# Patient Record
Sex: MALE | Race: WHITE | NOT HISPANIC OR LATINO | Employment: FULL TIME | URBAN - METROPOLITAN AREA
[De-identification: names, ages, dates, MRNs, and addresses within clinical notes are randomized per-mention and may not be internally consistent; named-entity substitution may affect disease eponyms.]

---

## 2021-10-28 ENCOUNTER — HOSPITAL ENCOUNTER (INPATIENT)
Facility: CLINIC | Age: 71
LOS: 5 days | Discharge: HOME | DRG: 177 | End: 2021-11-02
Attending: EMERGENCY MEDICINE | Admitting: INTERNAL MEDICINE
Payer: COMMERCIAL

## 2021-10-28 ENCOUNTER — APPOINTMENT (OUTPATIENT)
Dept: CT IMAGING | Facility: CLINIC | Age: 71
DRG: 177 | End: 2021-10-28
Attending: EMERGENCY MEDICINE
Payer: COMMERCIAL

## 2021-10-28 ENCOUNTER — APPOINTMENT (OUTPATIENT)
Dept: GENERAL RADIOLOGY | Facility: CLINIC | Age: 71
DRG: 177 | End: 2021-10-28
Attending: EMERGENCY MEDICINE
Payer: COMMERCIAL

## 2021-10-28 DIAGNOSIS — R09.02 HYPOXIA: ICD-10-CM

## 2021-10-28 DIAGNOSIS — D64.9 ANEMIA, UNSPECIFIED TYPE: ICD-10-CM

## 2021-10-28 DIAGNOSIS — U07.1 INFECTION DUE TO 2019 NOVEL CORONAVIRUS: ICD-10-CM

## 2021-10-28 LAB
ABO/RH(D): NORMAL
ALBUMIN SERPL-MCNC: 3.1 G/DL (ref 3.4–5)
ALP SERPL-CCNC: 73 U/L (ref 40–150)
ALT SERPL W P-5'-P-CCNC: 22 U/L (ref 0–70)
ANION GAP SERPL CALCULATED.3IONS-SCNC: 9 MMOL/L (ref 3–14)
ANTIBODY SCREEN: NEGATIVE
AST SERPL W P-5'-P-CCNC: 28 U/L (ref 0–45)
BASOPHILS # BLD AUTO: 0 10E3/UL (ref 0–0.2)
BASOPHILS NFR BLD AUTO: 0 %
BILIRUB SERPL-MCNC: 0.7 MG/DL (ref 0.2–1.3)
BLD PROD TYP BPU: NORMAL
BLOOD COMPONENT TYPE: NORMAL
BUN SERPL-MCNC: 28 MG/DL (ref 7–30)
CALCIUM SERPL-MCNC: 8.1 MG/DL (ref 8.5–10.1)
CHLORIDE BLD-SCNC: 95 MMOL/L (ref 94–109)
CO2 SERPL-SCNC: 22 MMOL/L (ref 20–32)
CODING SYSTEM: NORMAL
CREAT SERPL-MCNC: 1.29 MG/DL (ref 0.66–1.25)
CROSSMATCH: NORMAL
EOSINOPHIL # BLD AUTO: 0 10E3/UL (ref 0–0.7)
EOSINOPHIL NFR BLD AUTO: 0 %
ERYTHROCYTE [DISTWIDTH] IN BLOOD BY AUTOMATED COUNT: 15.9 % (ref 10–15)
GFR SERPL CREATININE-BSD FRML MDRD: 56 ML/MIN/1.73M2
GLUCOSE BLD-MCNC: 141 MG/DL (ref 70–99)
HCT VFR BLD AUTO: 18.5 % (ref 40–53)
HGB BLD-MCNC: 6.1 G/DL (ref 13.3–17.7)
HOLD SPECIMEN: NORMAL
IMM GRANULOCYTES # BLD: 0.1 10E3/UL
IMM GRANULOCYTES NFR BLD: 2 %
ISSUE DATE AND TIME: NORMAL
LACTATE SERPL-SCNC: 1.1 MMOL/L (ref 0.7–2)
LYMPHOCYTES # BLD AUTO: 0.1 10E3/UL (ref 0.8–5.3)
LYMPHOCYTES NFR BLD AUTO: 2 %
MCH RBC QN AUTO: 28.9 PG (ref 26.5–33)
MCHC RBC AUTO-ENTMCNC: 33 G/DL (ref 31.5–36.5)
MCV RBC AUTO: 88 FL (ref 78–100)
MONOCYTES # BLD AUTO: 0.3 10E3/UL (ref 0–1.3)
MONOCYTES NFR BLD AUTO: 6 %
NEUTROPHILS # BLD AUTO: 4.3 10E3/UL (ref 1.6–8.3)
NEUTROPHILS NFR BLD AUTO: 90 %
NRBC # BLD AUTO: 0 10E3/UL
NRBC BLD AUTO-RTO: 0 /100
PLATELET # BLD AUTO: 94 10E3/UL (ref 150–450)
POTASSIUM BLD-SCNC: 4.9 MMOL/L (ref 3.4–5.3)
PROT SERPL-MCNC: 6.4 G/DL (ref 6.8–8.8)
RBC # BLD AUTO: 2.11 10E6/UL (ref 4.4–5.9)
SODIUM SERPL-SCNC: 126 MMOL/L (ref 133–144)
SPECIMEN EXPIRATION DATE: NORMAL
TROPONIN I SERPL-MCNC: <0.015 UG/L (ref 0–0.04)
UNIT ABO/RH: NORMAL
UNIT NUMBER: NORMAL
UNIT STATUS: NORMAL
UNIT TYPE ISBT: 5100
WBC # BLD AUTO: 4.7 10E3/UL (ref 4–11)

## 2021-10-28 PROCEDURE — 99223 1ST HOSP IP/OBS HIGH 75: CPT | Mod: AI | Performed by: INTERNAL MEDICINE

## 2021-10-28 PROCEDURE — 999N000157 HC STATISTIC RCP TIME EA 10 MIN

## 2021-10-28 PROCEDURE — 85025 COMPLETE CBC W/AUTO DIFF WBC: CPT | Performed by: EMERGENCY MEDICINE

## 2021-10-28 PROCEDURE — 86140 C-REACTIVE PROTEIN: CPT | Performed by: INTERNAL MEDICINE

## 2021-10-28 PROCEDURE — 80053 COMPREHEN METABOLIC PANEL: CPT | Performed by: EMERGENCY MEDICINE

## 2021-10-28 PROCEDURE — 250N000011 HC RX IP 250 OP 636: Performed by: EMERGENCY MEDICINE

## 2021-10-28 PROCEDURE — 250N000013 HC RX MED GY IP 250 OP 250 PS 637: Performed by: EMERGENCY MEDICINE

## 2021-10-28 PROCEDURE — 86923 COMPATIBILITY TEST ELECTRIC: CPT | Performed by: EMERGENCY MEDICINE

## 2021-10-28 PROCEDURE — 96374 THER/PROPH/DIAG INJ IV PUSH: CPT

## 2021-10-28 PROCEDURE — P9016 RBC LEUKOCYTES REDUCED: HCPCS | Performed by: EMERGENCY MEDICINE

## 2021-10-28 PROCEDURE — 71045 X-RAY EXAM CHEST 1 VIEW: CPT

## 2021-10-28 PROCEDURE — 84484 ASSAY OF TROPONIN QUANT: CPT | Performed by: EMERGENCY MEDICINE

## 2021-10-28 PROCEDURE — 86901 BLOOD TYPING SEROLOGIC RH(D): CPT | Performed by: EMERGENCY MEDICINE

## 2021-10-28 PROCEDURE — 99285 EMERGENCY DEPT VISIT HI MDM: CPT | Mod: 25

## 2021-10-28 PROCEDURE — 200N000001 HC R&B ICU

## 2021-10-28 PROCEDURE — 94660 CPAP INITIATION&MGMT: CPT

## 2021-10-28 PROCEDURE — 83605 ASSAY OF LACTIC ACID: CPT | Performed by: EMERGENCY MEDICINE

## 2021-10-28 PROCEDURE — 5A09357 ASSISTANCE WITH RESPIRATORY VENTILATION, LESS THAN 24 CONSECUTIVE HOURS, CONTINUOUS POSITIVE AIRWAY PRESSURE: ICD-10-PCS | Performed by: INTERNAL MEDICINE

## 2021-10-28 PROCEDURE — 36415 COLL VENOUS BLD VENIPUNCTURE: CPT | Performed by: EMERGENCY MEDICINE

## 2021-10-28 PROCEDURE — 96375 TX/PRO/DX INJ NEW DRUG ADDON: CPT

## 2021-10-28 PROCEDURE — 85379 FIBRIN DEGRADATION QUANT: CPT | Performed by: INTERNAL MEDICINE

## 2021-10-28 PROCEDURE — 70450 CT HEAD/BRAIN W/O DYE: CPT

## 2021-10-28 PROCEDURE — 93005 ELECTROCARDIOGRAM TRACING: CPT

## 2021-10-28 RX ORDER — ACETAMINOPHEN 500 MG
1000 TABLET ORAL EVERY 4 HOURS PRN
Status: DISCONTINUED | OUTPATIENT
Start: 2021-10-28 | End: 2021-10-29

## 2021-10-28 RX ORDER — LORAZEPAM 2 MG/ML
1 INJECTION INTRAMUSCULAR ONCE
Status: COMPLETED | OUTPATIENT
Start: 2021-10-28 | End: 2021-10-28

## 2021-10-28 RX ORDER — DEXAMETHASONE SODIUM PHOSPHATE 10 MG/ML
6 INJECTION, SOLUTION INTRAMUSCULAR; INTRAVENOUS ONCE
Status: COMPLETED | OUTPATIENT
Start: 2021-10-28 | End: 2021-10-28

## 2021-10-28 RX ADMIN — DEXAMETHASONE SODIUM PHOSPHATE 6 MG: 10 INJECTION INTRAMUSCULAR; INTRAVENOUS at 19:34

## 2021-10-28 RX ADMIN — LORAZEPAM 1 MG: 2 INJECTION INTRAMUSCULAR; INTRAVENOUS at 20:20

## 2021-10-28 RX ADMIN — ACETAMINOPHEN 1000 MG: 500 TABLET, FILM COATED ORAL at 19:03

## 2021-10-28 ASSESSMENT — ENCOUNTER SYMPTOMS
CONFUSION: 1
SORE THROAT: 1
FATIGUE: 1
COUGH: 1
SPEECH DIFFICULTY: 0

## 2021-10-28 ASSESSMENT — ACTIVITIES OF DAILY LIVING (ADL)
ADLS_ACUITY_SCORE: 12

## 2021-10-28 NOTE — ED PROVIDER NOTES
History   Chief Complaint:  Covid Concern     The history is provided by the patient and a relative.      Mikal Nesbitt is a 71 year old male with history of obesity, type 2 diabetes, asthma who presents with Covid concern. The patient reports that he has been experiencing cough, sore throat, fatigue for about 7 days. He is answering questions normally, but has been confused today per his family. For example, he is unable to report the month and appears frustrated. Of note, the patient tested positive for Covid-19 about 7 days ago. His wife and son have also reportedly tested positive. The patient is fully vaccinated for Covid-19. There is no word-finding difficulty or slurred speech.    Review of Systems   Constitutional: Positive for fatigue.   HENT: Positive for sore throat.    Respiratory: Positive for cough.    Neurological: Negative for speech difficulty.   Psychiatric/Behavioral: Positive for confusion.   All other systems reviewed and are negative.        Allergies:  Amoxicillin  Lisinopril    Medications:  Bactrim  Glimepiride  Amlodipine  Allopurinol  Azelastine  Zovirax  Metformin  Hydrochlorothiazide  Atorvastatin    Past Medical History:     Type 2 diabetes  Diabetic retinopathy  Anemia  Thrombocytopenia  Follicular non-Hodgkin's lymphoma  Arterial stenosis  Hypertension  Hyperlipidemia   Asthma  Obesity  Neck lipoma  WALDEMAR  Testicular cyst  Hearing loss  Colonic polyps  Fatty liver      Past Surgical History:    Rhinoplasty  Lymphadenectomy  Vasectomy  Tonsillectomy and adenoidectomy  Sinus surgery    Family History:    Father: Cataract  Mother: Diabetes, cataract    Social History:  Presents to the emergency department alone  Arrives via car  Is  with a son    Physical Exam     Patient Vitals for the past 24 hrs:   BP Temp Temp src Pulse Resp SpO2   10/28/21 1723 134/44 (!) 101.6  F (38.7  C) Temporal 98 18 90 %       Physical Exam  Vitals: reviewed by me  General: Pt seen on [facility],  "pleasant, cooperative, and alert to conversation.  Sick appearing however.  Cannot tell me what month it is, states \"I do not know\"  Eyes: Tracking well, clear conjunctiva BL  ENT: MMM, midline trachea.   Lungs: Moderate respiratory distress, tachypneic with accessory muscle use noted.  Coughing frequently.  CV: Rate as above  MSK: no joint effusion.  No evidence of trauma  Skin: No rash  Neuro: Clear speech and no facial droop.  Moving all extremity spontaneously, following all commands.  Does not seem to have any word finding difficulty  Psych: Not RIS, no e/o AH/VH      Emergency Department Course   ECG  ECG obtained at 1733, ECG read at 1802  Normal sinus rhythm  Left anterior fascicular block   Rate 97 bpm. UT interval 150 ms. QRS duration 104 ms. QT/QTc 330/419 ms. P-R-T axes 39 -54 58.     Imaging:  CT Head w/o Contrast   Final Result   IMPRESSION:   1.  No CT evidence for acute intracranial process.   2.  Brain atrophy and presumed chronic microvascular ischemic changes as above. No intracranial mass, mass effect, or acute/evolving infarction.      3.  Air-fluid level right maxillary sinus may indicate acute right maxillary sinus inflammatory change.      4.  Mild motion artifact does degrade image quality.      XR Chest 1 View   Final Result   IMPRESSION: Severe bilateral airspace opacities consistent with pneumonia. Right IJ port catheter tip in the right atrium. No fluid collections or pneumothorax. Normal heart size.        Report per radiology    Laboratory:  Labs Ordered and Resulted from Time of ED Arrival to Time of ED Departure   COMPREHENSIVE METABOLIC PANEL - Abnormal       Result Value    Sodium 126 (*)     Potassium 4.9      Chloride 95      Carbon Dioxide (CO2) 22      Anion Gap 9      Urea Nitrogen 28      Creatinine 1.29 (*)     Calcium 8.1 (*)     Glucose 141 (*)     Alkaline Phosphatase 73      AST 28      ALT 22      Protein Total 6.4 (*)     Albumin 3.1 (*)     Bilirubin Total 0.7      " GFR Estimate 56 (*)    CBC WITH PLATELETS AND DIFFERENTIAL - Abnormal    WBC Count 4.7      RBC Count 2.11 (*)     Hemoglobin 6.1 (*)     Hematocrit 18.5 (*)     MCV 88      MCH 28.9      MCHC 33.0      RDW 15.9 (*)     Platelet Count 94 (*)     % Neutrophils 90      % Lymphocytes 2      % Monocytes 6      % Eosinophils 0      % Basophils 0      % Immature Granulocytes 2      NRBCs per 100 WBC 0      Absolute Neutrophils 4.3      Absolute Lymphocytes 0.1 (*)     Absolute Monocytes 0.3      Absolute Eosinophils 0.0      Absolute Basophils 0.0      Absolute Immature Granulocytes 0.1 (*)     Absolute NRBCs 0.0     TROPONIN I - Normal    Troponin I <0.015     LACTIC ACID WHOLE BLOOD - Normal    Lactic Acid 1.1     TYPE AND SCREEN, ADULT    ABO/RH(D) O POS      Antibody Screen Negative      SPECIMEN EXPIRATION DATE [id number]     PREPARE RED BLOOD CELLS (UNIT)    CROSSMATCH Compatible      UNIT ABO/RH O Pos      Unit Number [id number]      Unit Status Issued      Blood Component Type Red Blood Cells      Product Code G3248L40      CODING SYSTEM [id number]      UNIT TYPE ISBT 5100      ISSUE DATE AND TIME [id number]     PREPARE RED BLOOD CELLS (UNIT)   TRANSFUSE RED BLOOD CELLS (UNIT)   ABO/RH TYPE AND SCREEN        Emergency Department Course:  Reviewed:  I reviewed nursing notes, vitals and past medical history    Assessments:  1804 I obtained history and examined the patient as noted above.  1840 I rechecked the patient and explained findings.    Consults:  1929 I spoke with Dr. Rusty Dowd, hospitalist, who agreed to admit the patient.     Interventions:  1903 Tylenol 1000mg Oral  1934 Decadron 6mg IV  2020 Ativan 1mg IV    Disposition:  The patient was admitted to the hospital under the care of Dr. Dowd.     Impression & Plan     CMS Diagnoses: None    Medical Decision Making:  Mikal Nesbitt is a pleasant 71 year old male who is fully vaccinated, who presents to the emergency department with what appears to  be a worsening Covid infection. His symptoms are about 7 days old, but he got much more confused today, and his x-ray is highly concerning for worsening respiratory status. He actually was doing well here on nasal cannula initially, but I have decided to put him on BiPAP, as he has started to slowly decompensate and we want to stay ahead of it. After talking to the hospitalist, we are planning to admit him to the ICU given his immunocompromised state and high potential for not even being able to form antibodies, even with the vaccines. He also has anemia, which is not new for him, though it is under 7 and for this reasoning, he will be transfused. He was consented and his wife signed for him. His confusion may also be due to the hyponatremia, he is receiving judicious fluids for this given his pulmonary state. He also got decadron, and I do think he needs to be admitted to the ICU as above. Will plan for careful monitoring until next ICU bed is available. Dr. Dowd has kindly agreed to accept care of the patient.    Critical Care Time: was 30 minutes for this patient excluding procedures    Diagnosis:    ICD-10-CM    1. Infection due to 2019 novel coronavirus  U07.1    2. Hypoxia  R09.02    3. Anemia, unspecified type  D64.9        Scribe Disclosure:  I, Mikie Macdonald, am serving as a scribe at 6:03 PM on 10/28/2021 to document services personally performed by Jw Burton MD based on my observations and the provider's statements to me.              Jw Burton MD  10/28/21 1148

## 2021-10-28 NOTE — ED TRIAGE NOTES
Tested for Covid last Thursday. Came back positive. Patient altered today. Not eating. Normally alert and oriented. Difficulty following basic commands. High fevers at home. Family member with patient has covid as well, but is historian. Vaccinated patient.

## 2021-10-29 LAB
ALBUMIN SERPL-MCNC: 2.5 G/DL (ref 3.4–5)
ALP SERPL-CCNC: 63 U/L (ref 40–150)
ALT SERPL W P-5'-P-CCNC: 23 U/L (ref 0–70)
ANION GAP SERPL CALCULATED.3IONS-SCNC: 8 MMOL/L (ref 3–14)
AST SERPL W P-5'-P-CCNC: 22 U/L (ref 0–45)
ATRIAL RATE - MUSE: 97 BPM
BASE EXCESS BLDV CALC-SCNC: -6.2 MMOL/L (ref -7.7–1.9)
BILIRUB SERPL-MCNC: 0.4 MG/DL (ref 0.2–1.3)
BLD PROD TYP BPU: NORMAL
BLOOD COMPONENT TYPE: NORMAL
BUN SERPL-MCNC: 28 MG/DL (ref 7–30)
CALCIUM SERPL-MCNC: 7.8 MG/DL (ref 8.5–10.1)
CHLORIDE BLD-SCNC: 103 MMOL/L (ref 94–109)
CO2 SERPL-SCNC: 20 MMOL/L (ref 20–32)
CODING SYSTEM: NORMAL
CREAT SERPL-MCNC: 1.07 MG/DL (ref 0.66–1.25)
CROSSMATCH: NORMAL
CRP SERPL-MCNC: 112 MG/L (ref 0–8)
CRP SERPL-MCNC: 115 MG/L (ref 0–8)
D DIMER PPP FEU-MCNC: 1.71 UG/ML FEU (ref 0–0.5)
D DIMER PPP FEU-MCNC: 1.92 UG/ML FEU (ref 0–0.5)
DIASTOLIC BLOOD PRESSURE - MUSE: NORMAL MMHG
ERYTHROCYTE [DISTWIDTH] IN BLOOD BY AUTOMATED COUNT: 17.5 % (ref 10–15)
GFR SERPL CREATININE-BSD FRML MDRD: 70 ML/MIN/1.73M2
GLUCOSE BLD-MCNC: 263 MG/DL (ref 70–99)
GLUCOSE BLDC GLUCOMTR-MCNC: 148 MG/DL (ref 70–99)
GLUCOSE BLDC GLUCOMTR-MCNC: 167 MG/DL (ref 70–99)
GLUCOSE BLDC GLUCOMTR-MCNC: 192 MG/DL (ref 70–99)
GLUCOSE BLDC GLUCOMTR-MCNC: 215 MG/DL (ref 70–99)
GLUCOSE BLDC GLUCOMTR-MCNC: 217 MG/DL (ref 70–99)
GLUCOSE BLDC GLUCOMTR-MCNC: 261 MG/DL (ref 70–99)
HBA1C MFR BLD: 5.2 % (ref 0–5.6)
HCO3 BLDV-SCNC: 20 MMOL/L (ref 21–28)
HCT VFR BLD AUTO: 21.9 % (ref 40–53)
HGB BLD-MCNC: 6.6 G/DL (ref 13.3–17.7)
HGB BLD-MCNC: 7 G/DL (ref 13.3–17.7)
HGB BLD-MCNC: 7 G/DL (ref 13.3–17.7)
INTERPRETATION ECG - MUSE: NORMAL
ISSUE DATE AND TIME: NORMAL
MAGNESIUM SERPL-MCNC: 2 MG/DL (ref 1.6–2.3)
MCH RBC QN AUTO: 29 PG (ref 26.5–33)
MCHC RBC AUTO-ENTMCNC: 32 G/DL (ref 31.5–36.5)
MCV RBC AUTO: 91 FL (ref 78–100)
O2/TOTAL GAS SETTING VFR VENT: 25 %
P AXIS - MUSE: 39 DEGREES
PCO2 BLDV: 40 MM HG (ref 40–50)
PH BLDV: 7.3 [PH] (ref 7.32–7.43)
PLATELET # BLD AUTO: 66 10E3/UL (ref 150–450)
PO2 BLDV: 39 MM HG (ref 25–47)
POTASSIUM BLD-SCNC: 4.7 MMOL/L (ref 3.4–5.3)
PR INTERVAL - MUSE: 150 MS
PROT SERPL-MCNC: 5.7 G/DL (ref 6.8–8.8)
QRS DURATION - MUSE: 104 MS
QT - MUSE: 330 MS
QTC - MUSE: 419 MS
R AXIS - MUSE: -54 DEGREES
RBC # BLD AUTO: 2.41 10E6/UL (ref 4.4–5.9)
SODIUM SERPL-SCNC: 127 MMOL/L (ref 133–144)
SODIUM SERPL-SCNC: 131 MMOL/L (ref 133–144)
SYSTOLIC BLOOD PRESSURE - MUSE: NORMAL MMHG
T AXIS - MUSE: 58 DEGREES
TROPONIN I SERPL-MCNC: <0.015 UG/L (ref 0–0.04)
UNIT ABO/RH: NORMAL
UNIT NUMBER: NORMAL
UNIT STATUS: NORMAL
UNIT TYPE ISBT: 5100
VENTRICULAR RATE- MUSE: 97 BPM
WBC # BLD AUTO: 2.1 10E3/UL (ref 4–11)

## 2021-10-29 PROCEDURE — 82803 BLOOD GASES ANY COMBINATION: CPT | Performed by: INTERNAL MEDICINE

## 2021-10-29 PROCEDURE — XW043E5 INTRODUCTION OF REMDESIVIR ANTI-INFECTIVE INTO CENTRAL VEIN, PERCUTANEOUS APPROACH, NEW TECHNOLOGY GROUP 5: ICD-10-PCS | Performed by: HOSPITALIST

## 2021-10-29 PROCEDURE — 36415 COLL VENOUS BLD VENIPUNCTURE: CPT | Performed by: INTERNAL MEDICINE

## 2021-10-29 PROCEDURE — P9016 RBC LEUKOCYTES REDUCED: HCPCS | Performed by: INTERNAL MEDICINE

## 2021-10-29 PROCEDURE — 250N000009 HC RX 250: Performed by: INTERNAL MEDICINE

## 2021-10-29 PROCEDURE — 250N000012 HC RX MED GY IP 250 OP 636 PS 637: Performed by: HOSPITALIST

## 2021-10-29 PROCEDURE — 258N000003 HC RX IP 258 OP 636: Performed by: INTERNAL MEDICINE

## 2021-10-29 PROCEDURE — 250N000011 HC RX IP 250 OP 636: Performed by: INTERNAL MEDICINE

## 2021-10-29 PROCEDURE — 85018 HEMOGLOBIN: CPT | Performed by: INTERNAL MEDICINE

## 2021-10-29 PROCEDURE — 200N000001 HC R&B ICU

## 2021-10-29 PROCEDURE — 80053 COMPREHEN METABOLIC PANEL: CPT | Performed by: INTERNAL MEDICINE

## 2021-10-29 PROCEDURE — 85027 COMPLETE CBC AUTOMATED: CPT | Performed by: INTERNAL MEDICINE

## 2021-10-29 PROCEDURE — 83036 HEMOGLOBIN GLYCOSYLATED A1C: CPT | Performed by: HOSPITALIST

## 2021-10-29 PROCEDURE — 94660 CPAP INITIATION&MGMT: CPT

## 2021-10-29 PROCEDURE — 36591 DRAW BLOOD OFF VENOUS DEVICE: CPT | Performed by: INTERNAL MEDICINE

## 2021-10-29 PROCEDURE — 999N000157 HC STATISTIC RCP TIME EA 10 MIN

## 2021-10-29 PROCEDURE — 250N000012 HC RX MED GY IP 250 OP 636 PS 637: Performed by: INTERNAL MEDICINE

## 2021-10-29 PROCEDURE — 83735 ASSAY OF MAGNESIUM: CPT | Performed by: INTERNAL MEDICINE

## 2021-10-29 PROCEDURE — 84295 ASSAY OF SERUM SODIUM: CPT | Performed by: INTERNAL MEDICINE

## 2021-10-29 PROCEDURE — 250N000013 HC RX MED GY IP 250 OP 250 PS 637: Performed by: INTERNAL MEDICINE

## 2021-10-29 PROCEDURE — 84484 ASSAY OF TROPONIN QUANT: CPT | Performed by: INTERNAL MEDICINE

## 2021-10-29 PROCEDURE — 86923 COMPATIBILITY TEST ELECTRIC: CPT | Performed by: INTERNAL MEDICINE

## 2021-10-29 PROCEDURE — 85379 FIBRIN DEGRADATION QUANT: CPT | Performed by: INTERNAL MEDICINE

## 2021-10-29 PROCEDURE — 86140 C-REACTIVE PROTEIN: CPT | Performed by: INTERNAL MEDICINE

## 2021-10-29 PROCEDURE — 999N000185 HC STATISTIC TRANSPORT TIME EA 15 MIN

## 2021-10-29 PROCEDURE — 99291 CRITICAL CARE FIRST HOUR: CPT | Performed by: HOSPITALIST

## 2021-10-29 RX ORDER — ATORVASTATIN CALCIUM 20 MG/1
20 TABLET, FILM COATED ORAL EVERY EVENING
Status: DISCONTINUED | OUTPATIENT
Start: 2021-10-29 | End: 2021-11-02 | Stop reason: HOSPADM

## 2021-10-29 RX ORDER — SULFAMETHOXAZOLE AND TRIMETHOPRIM 400; 80 MG/1; MG/1
2 TABLET ORAL
Status: DISCONTINUED | OUTPATIENT
Start: 2021-10-29 | End: 2021-11-02 | Stop reason: HOSPADM

## 2021-10-29 RX ORDER — METFORMIN HCL 500 MG
1000 TABLET, EXTENDED RELEASE 24 HR ORAL 2 TIMES DAILY WITH MEALS
COMMUNITY

## 2021-10-29 RX ORDER — FLUTICASONE PROPIONATE 50 MCG
1 SPRAY, SUSPENSION (ML) NASAL DAILY
Status: DISCONTINUED | OUTPATIENT
Start: 2021-10-29 | End: 2021-11-02 | Stop reason: HOSPADM

## 2021-10-29 RX ORDER — AMLODIPINE BESYLATE 10 MG/1
10 TABLET ORAL DAILY
Status: DISCONTINUED | OUTPATIENT
Start: 2021-10-29 | End: 2021-11-02 | Stop reason: HOSPADM

## 2021-10-29 RX ORDER — CHLORAL HYDRATE 500 MG
1 CAPSULE ORAL DAILY
COMMUNITY

## 2021-10-29 RX ORDER — LOPERAMIDE HCL 2 MG
2 CAPSULE ORAL 4 TIMES DAILY PRN
Status: DISCONTINUED | OUTPATIENT
Start: 2021-10-29 | End: 2021-11-02 | Stop reason: HOSPADM

## 2021-10-29 RX ORDER — ACETAMINOPHEN 650 MG/1
650 SUPPOSITORY RECTAL EVERY 6 HOURS PRN
Status: DISCONTINUED | OUTPATIENT
Start: 2021-10-29 | End: 2021-11-02 | Stop reason: HOSPADM

## 2021-10-29 RX ORDER — SULFAMETHOXAZOLE/TRIMETHOPRIM 800-160 MG
1 TABLET ORAL
COMMUNITY

## 2021-10-29 RX ORDER — AZELASTINE 1 MG/ML
1 SPRAY, METERED NASAL 2 TIMES DAILY PRN
COMMUNITY

## 2021-10-29 RX ORDER — LORAZEPAM 2 MG/ML
0.5 INJECTION INTRAMUSCULAR EVERY 4 HOURS PRN
Status: DISCONTINUED | OUTPATIENT
Start: 2021-10-29 | End: 2021-11-02 | Stop reason: HOSPADM

## 2021-10-29 RX ORDER — BENZONATATE 100 MG/1
100 CAPSULE ORAL 3 TIMES DAILY PRN
Status: DISCONTINUED | OUTPATIENT
Start: 2021-10-29 | End: 2021-11-02 | Stop reason: HOSPADM

## 2021-10-29 RX ORDER — AMOXICILLIN 250 MG
2 CAPSULE ORAL 2 TIMES DAILY PRN
Status: DISCONTINUED | OUTPATIENT
Start: 2021-10-29 | End: 2021-11-02 | Stop reason: HOSPADM

## 2021-10-29 RX ORDER — NICOTINE POLACRILEX 4 MG
15-30 LOZENGE BUCCAL
Status: DISCONTINUED | OUTPATIENT
Start: 2021-10-29 | End: 2021-11-02 | Stop reason: HOSPADM

## 2021-10-29 RX ORDER — ONDANSETRON 2 MG/ML
4 INJECTION INTRAMUSCULAR; INTRAVENOUS EVERY 6 HOURS PRN
Status: DISCONTINUED | OUTPATIENT
Start: 2021-10-29 | End: 2021-11-02 | Stop reason: HOSPADM

## 2021-10-29 RX ORDER — UBIDECARENONE 200 MG
200 CAPSULE ORAL DAILY
COMMUNITY

## 2021-10-29 RX ORDER — PROCHLORPERAZINE MALEATE 5 MG
5 TABLET ORAL EVERY 6 HOURS PRN
Status: DISCONTINUED | OUTPATIENT
Start: 2021-10-29 | End: 2021-11-02 | Stop reason: HOSPADM

## 2021-10-29 RX ORDER — LIDOCAINE 40 MG/G
CREAM TOPICAL
Status: DISCONTINUED | OUTPATIENT
Start: 2021-10-29 | End: 2021-11-02 | Stop reason: HOSPADM

## 2021-10-29 RX ORDER — ACETAMINOPHEN 325 MG/1
650 TABLET ORAL EVERY 6 HOURS PRN
Status: DISCONTINUED | OUTPATIENT
Start: 2021-10-29 | End: 2021-11-02 | Stop reason: HOSPADM

## 2021-10-29 RX ORDER — POLYETHYLENE GLYCOL 3350 17 G/17G
17 POWDER, FOR SOLUTION ORAL DAILY PRN
Status: DISCONTINUED | OUTPATIENT
Start: 2021-10-29 | End: 2021-11-02 | Stop reason: HOSPADM

## 2021-10-29 RX ORDER — DEXAMETHASONE SODIUM PHOSPHATE 10 MG/ML
6 INJECTION, SOLUTION INTRAMUSCULAR; INTRAVENOUS DAILY
Status: DISCONTINUED | OUTPATIENT
Start: 2021-10-29 | End: 2021-11-01

## 2021-10-29 RX ORDER — ACYCLOVIR 200 MG/1
400 CAPSULE ORAL 2 TIMES DAILY
Status: DISCONTINUED | OUTPATIENT
Start: 2021-10-29 | End: 2021-10-31

## 2021-10-29 RX ORDER — DEXTROSE MONOHYDRATE 25 G/50ML
25-50 INJECTION, SOLUTION INTRAVENOUS
Status: DISCONTINUED | OUTPATIENT
Start: 2021-10-29 | End: 2021-10-31

## 2021-10-29 RX ORDER — GLIMEPIRIDE 4 MG/1
4 TABLET ORAL
COMMUNITY

## 2021-10-29 RX ORDER — ACYCLOVIR 400 MG/1
400 TABLET ORAL 2 TIMES DAILY
COMMUNITY

## 2021-10-29 RX ORDER — HYDROCHLOROTHIAZIDE 25 MG/1
12.5 TABLET ORAL DAILY
COMMUNITY

## 2021-10-29 RX ORDER — NICOTINE POLACRILEX 4 MG
15-30 LOZENGE BUCCAL
Status: DISCONTINUED | OUTPATIENT
Start: 2021-10-29 | End: 2021-10-31

## 2021-10-29 RX ORDER — AMOXICILLIN 250 MG
1 CAPSULE ORAL 2 TIMES DAILY PRN
Status: DISCONTINUED | OUTPATIENT
Start: 2021-10-29 | End: 2021-11-02 | Stop reason: HOSPADM

## 2021-10-29 RX ORDER — FLUTICASONE PROPIONATE 50 MCG
2 SPRAY, SUSPENSION (ML) NASAL 2 TIMES DAILY
COMMUNITY

## 2021-10-29 RX ORDER — PROCHLORPERAZINE 25 MG
12.5 SUPPOSITORY, RECTAL RECTAL EVERY 12 HOURS PRN
Status: DISCONTINUED | OUTPATIENT
Start: 2021-10-29 | End: 2021-11-02 | Stop reason: HOSPADM

## 2021-10-29 RX ORDER — IPRATROPIUM BROMIDE 21 UG/1
2 SPRAY, METERED NASAL EVERY 8 HOURS PRN
COMMUNITY

## 2021-10-29 RX ORDER — AMLODIPINE BESYLATE 10 MG/1
10 TABLET ORAL DAILY
COMMUNITY

## 2021-10-29 RX ORDER — ATORVASTATIN CALCIUM 20 MG/1
20 TABLET, FILM COATED ORAL DAILY
COMMUNITY

## 2021-10-29 RX ORDER — DEXTROSE MONOHYDRATE 25 G/50ML
25-50 INJECTION, SOLUTION INTRAVENOUS
Status: DISCONTINUED | OUTPATIENT
Start: 2021-10-29 | End: 2021-11-02 | Stop reason: HOSPADM

## 2021-10-29 RX ORDER — ONDANSETRON 4 MG/1
4 TABLET, ORALLY DISINTEGRATING ORAL EVERY 6 HOURS PRN
Status: DISCONTINUED | OUTPATIENT
Start: 2021-10-29 | End: 2021-11-02 | Stop reason: HOSPADM

## 2021-10-29 RX ORDER — SODIUM CHLORIDE 9 MG/ML
INJECTION, SOLUTION INTRAVENOUS CONTINUOUS
Status: DISCONTINUED | OUTPATIENT
Start: 2021-10-29 | End: 2021-10-30

## 2021-10-29 RX ORDER — BISACODYL 10 MG
10 SUPPOSITORY, RECTAL RECTAL DAILY PRN
Status: DISCONTINUED | OUTPATIENT
Start: 2021-10-29 | End: 2021-11-02 | Stop reason: HOSPADM

## 2021-10-29 RX ORDER — ALLOPURINOL 300 MG/1
300 TABLET ORAL DAILY
Status: DISCONTINUED | OUTPATIENT
Start: 2021-10-29 | End: 2021-10-29

## 2021-10-29 RX ADMIN — SODIUM CHLORIDE: 9 INJECTION, SOLUTION INTRAVENOUS at 18:39

## 2021-10-29 RX ADMIN — INSULIN ASPART 2 UNITS: 100 INJECTION, SOLUTION INTRAVENOUS; SUBCUTANEOUS at 20:03

## 2021-10-29 RX ADMIN — INSULIN ASPART 2 UNITS: 100 INJECTION, SOLUTION INTRAVENOUS; SUBCUTANEOUS at 08:23

## 2021-10-29 RX ADMIN — LORAZEPAM 0.5 MG: 2 INJECTION INTRAMUSCULAR; INTRAVENOUS at 22:47

## 2021-10-29 RX ADMIN — DEXAMETHASONE SODIUM PHOSPHATE 6 MG: 10 INJECTION INTRAMUSCULAR; INTRAVENOUS at 13:34

## 2021-10-29 RX ADMIN — TOCILIZUMAB 600 MG: 20 INJECTION, SOLUTION, CONCENTRATE INTRAVENOUS at 13:54

## 2021-10-29 RX ADMIN — SULFAMETHOXAZOLE AND TRIMETHOPRIM 2 TABLET: 400; 80 TABLET ORAL at 13:33

## 2021-10-29 RX ADMIN — SODIUM CHLORIDE: 9 INJECTION, SOLUTION INTRAVENOUS at 05:46

## 2021-10-29 RX ADMIN — INSULIN ASPART 3 UNITS: 100 INJECTION, SOLUTION INTRAVENOUS; SUBCUTANEOUS at 04:38

## 2021-10-29 RX ADMIN — INSULIN ASPART 1 UNITS: 100 INJECTION, SOLUTION INTRAVENOUS; SUBCUTANEOUS at 13:35

## 2021-10-29 RX ADMIN — ATORVASTATIN CALCIUM 20 MG: 20 TABLET, FILM COATED ORAL at 20:05

## 2021-10-29 RX ADMIN — AMLODIPINE BESYLATE 10 MG: 10 TABLET ORAL at 13:33

## 2021-10-29 RX ADMIN — REMDESIVIR 200 MG: 100 INJECTION, POWDER, LYOPHILIZED, FOR SOLUTION INTRAVENOUS at 01:43

## 2021-10-29 RX ADMIN — FAMOTIDINE 20 MG: 10 INJECTION, SOLUTION INTRAVENOUS at 03:31

## 2021-10-29 RX ADMIN — ACYCLOVIR 400 MG: 200 CAPSULE ORAL at 13:33

## 2021-10-29 RX ADMIN — SODIUM CHLORIDE 50 ML: 9 INJECTION, SOLUTION INTRAVENOUS at 02:13

## 2021-10-29 RX ADMIN — SODIUM CHLORIDE 50 ML: 9 INJECTION, SOLUTION INTRAVENOUS at 21:16

## 2021-10-29 RX ADMIN — FAMOTIDINE 20 MG: 10 INJECTION, SOLUTION INTRAVENOUS at 18:30

## 2021-10-29 RX ADMIN — ACYCLOVIR 400 MG: 200 CAPSULE ORAL at 20:05

## 2021-10-29 RX ADMIN — INSULIN ASPART 2 UNITS: 100 INJECTION, SOLUTION INTRAVENOUS; SUBCUTANEOUS at 01:40

## 2021-10-29 RX ADMIN — ALLOPURINOL 300 MG: 300 TABLET ORAL at 13:34

## 2021-10-29 RX ADMIN — INSULIN HUMAN 15 UNITS: 100 INJECTION, SUSPENSION SUBCUTANEOUS at 14:01

## 2021-10-29 RX ADMIN — REMDESIVIR 100 MG: 100 INJECTION, POWDER, LYOPHILIZED, FOR SOLUTION INTRAVENOUS at 21:15

## 2021-10-29 RX ADMIN — INSULIN ASPART 1 UNITS: 100 INJECTION, SOLUTION INTRAVENOUS; SUBCUTANEOUS at 18:31

## 2021-10-29 ASSESSMENT — ACTIVITIES OF DAILY LIVING (ADL)
ADLS_ACUITY_SCORE: 9
ADLS_ACUITY_SCORE: 8
ADLS_ACUITY_SCORE: 8
ADLS_ACUITY_SCORE: 12
ADLS_ACUITY_SCORE: 9
ADLS_ACUITY_SCORE: 8
ADLS_ACUITY_SCORE: 9
ADLS_ACUITY_SCORE: 8
ADLS_ACUITY_SCORE: 8
ADLS_ACUITY_SCORE: 10
ADLS_ACUITY_SCORE: 8
ADLS_ACUITY_SCORE: 12
ADLS_ACUITY_SCORE: 4
ADLS_ACUITY_SCORE: 8

## 2021-10-29 ASSESSMENT — MIFFLIN-ST. JEOR: SCORE: 1492

## 2021-10-29 NOTE — PROGRESS NOTES
[facility]    Hospitalist Progress Note  Name: Mikal Nesbitt    MRN: [id number]  Provider:  Armand Huitron DO MPH  Date of Service: 10/29/2021    Summary of Stay: Mikal Nesbitt is a 71 year old male with a history of Follicular non Hodgkin's lymphoma, WALDEMAR, DM2, Obesity, left HASEEB, HTN, HLP admitted on 10/28/2021 with shortness of breath and confusion.  Tested positive for COVID-19 about 7 days ago, appears symptoms started to some degree around 3 weeks ago.  Developed worsening shortness of breath and confusion over the subsequent several days prompting his presentation to the ED.    Blood pressure 134/44, heart rate 98, temperature 101.6  F, oxygen 90-94% initially on 2 L. Initial labs show sodium 126, potassium 4.9, chloride 95, bicarb 22, BUN 28, creatinine 1.29. Albumin 3.1 otherwise LFTs normal. WBC 4.7, hemoglobin 6.1, platelet count 94. Blood glucose is 141. Lactic acid normal at 1.1 and troponin is undetectable. EKG shows sinus tachycardia with LAFB. Noncontrast head CT n all old for sure egative for any acute pathology. Chest x-ray shows severe bilateral opacities along with his right IJ port.     He was placed on BiPAP and admitted to the ICU.  Overnight he received 2 units of packed red blood cells.  He received 6 mg IV dexamethasone and remdesivir.  No significant issues overnight.    Problem List:   1. Acute hypoxic respiratory failure secondary to COVID-19 pneumonia: Doing reasonably well on fairly low BiPAP settings.  Will attempt transition to HFNC today.  Taking dexamethasone for 10 days and remdesivir for 5 days.  Will consider tocilizumab if inflammatory markers increase or there is clinical deterioration.  Hold enoxaparin for now given pancytopenia requiring blood transfusion support.  2. Acute metabolic encephalopathy: Likely due to hypoxia and COVID-19 infection with possible contribution from hyponatremia.  Mental status does appear to be improving.  3. Acute kidney injury:  Creatinine is improving.  This is likely prerenal and related to limited oral intake over the past several days.  We will continue normal saline for now, can discontinue when he comes off BiPAP and oral intake is attempted.  Hold PTA hydrochlorothiazide.  4. Pancytopenia: Baseline hemoglobin is 7-9.  Was 6.1 on admission and required 2 units of packed red blood cells with hemoglobin now greater than 7.  No reports of bleeding.  Recheck CBC tomorrow, no further transfusions for now.  5. Follicular non-Hodgkin's lymphoma: Diagnosed in 1999 and completed CHOP chemotherapy.  Had recurrent positive retroperitoneal biopsy and bone marrow involvement this year requiring initiation of bendamustine/Rituxan.  Dee on acyclovir and Bactrim for prophylaxis, continue these for now.  Continue prophylactic allopurinol.  6. WALDEMAR: Currently on BiPAP.  7. Diabetes mellitus: Recent hemoglobin A1c 4.1 but now at risk for steroid-induced hyperglycemia.  Blood sugars currently in the 200s.  Hold prior to admission Metformin and glipizide and start NPH 15 units to be given with dexamethasone and continue medium sliding scale insulin.  8. Left carotid artery stenosis: No intervention.  9. Hyperlipidemia: Continue atorvastatin, monitor liver function test while on remdesivir.  10. Hypertension: Blood pressure well controlled.  Continue amlodipine but hold prior to admission hydrochlorothiazide.    DVT Prophylaxis: Pneumatic Compression Devices  Code Status: Full Code  Diet: NPO for Medical/Clinical Reasons Except for: Meds    Finch Catheter: Not present  Disposition: Expected discharge in 5+ days to TBD. Goals prior to discharge include manage respiratory failure.   Incidental Findings: None.  Family updated today: Called wife, confidential voicemail left.     Greater than 30 minutes of critical care time was spent in direct bedside management of the patient, managing BiPAP and respiratory failure, review of the EMR and order placement,  discussion with intensivist (Dr Alcazar) and bedside RN, and updating family.     Interval History   Assumed care from previous hospitalist. The history was fully reviewed.  No chest pain but some coughing and shortness of breath. No nausea, vomiting, diarrhea, constipation. No fevers. No other specific complaints identified.     -Data reviewed today: I personally reviewed all new labs and imaging results over the last 24 hours.     Physical Exam   Temp: 99.1  F (37.3  C) Temp src: Axillary BP: 119/58 Pulse: 70   Resp: 21 SpO2: 96 % O2 Device: BiPAP/CPAP    Vitals:    10/29/21 0100   Weight: 81.6 kg (179 lb 14.3 oz)     Vital Signs with Ranges  Temp:  [97.5  F (36.4  C)-101.6  F (38.7  C)] 99.1  F (37.3  C)  Pulse:  [] 70  Resp:  [12-76] 21  BP: (113-163)/() 119/58  FiO2 (%):  [2 %-40 %] 25 %  SpO2:  [87 %-100 %] 96 %  I/O last 3 completed shifts:  In: 967.5 [I.V.:267.5; IV Piggyback:50]  Out: 840 [Urine:840]    GENERAL: No apparent distress. Awake, alert, and fully oriented. On BiPAP.  HEENT: Normocephalic, atraumatic. Extraocular movements intact.  CARDIOVASCULAR: Regular rate and rhythm without murmurs or rubs. No S3.  PULMONARY: Clear bilaterally.  GASTROINTESTINAL: Soft, non-tender, non-distended. Bowel sounds normoactive.   EXTREMITIES: No cyanosis or clubbing. No edema.  NEUROLOGICAL: CN 2-12 grossly intact, no focal neurological deficits.  DERMATOLOGICAL: No rash, ulcer, bruising, nor jaundice.     Medications     - MEDICATION INSTRUCTIONS -       sodium chloride 75 mL/hr at 10/29/21 0546       remdesivir  100 mg Intravenous Q24H    And     sodium chloride 0.9%  50 mL Intravenous Q24H     acyclovir  400 mg Oral BID     allopurinol  300 mg Oral Daily     amLODIPine  10 mg Oral Daily     atorvastatin  20 mg Oral QPM     dexamethasone  6 mg Intravenous Daily     famotidine  20 mg Intravenous Q12H     fluticasone  1 spray Both Nostrils Daily     insulin aspart  1-6 Units Subcutaneous Q4H     insulin  NPH  15 Units Subcutaneous Daily     sodium chloride (PF)  3 mL Intracatheter Q8H     sulfamethoxazole-trimethoprim  2 tablet Oral Once per day on Mon Wed Fri     Data     Laboratory:  Recent Labs   Lab 10/29/21  0542 10/29/21  0132 10/28/21  1740   WBC 2.1*  --  4.7   HGB 7.0* 6.6* 6.1*   HCT 21.9*  --  18.5*   MCV 91  --  88   PLT 66*  --  94*     Recent Labs   Lab 10/29/21  0542 10/29/21  0437 10/29/21  0132 10/29/21  0114 10/28/21  1740   *  --  127*  --  126*   POTASSIUM 4.7  --   --   --  4.9   CHLORIDE 103  --   --   --  95   CO2 20  --   --   --  22   ANIONGAP 8  --   --   --  9   * 261*  --  217* 141*   BUN 28  --   --   --  28   CR 1.07  --   --   --  1.29*   GFRESTIMATED 70  --   --   --  56*   LILIAN 7.8*  --   --   --  8.1*     No results for input(s): CULT in the last 168 hours.    Imaging:  Recent Results (from the past 24 hour(s))   XR Chest 1 View    Narrative    EXAM: XR CHEST 1 VIEW  LOCATION: [facility]  DATE/TIME: 10/28/2021 6:50 PM    INDICATION: covid  COMPARISON: None.      Impression    IMPRESSION: Severe bilateral airspace opacities consistent with pneumonia. Right IJ port catheter tip in the right atrium. No fluid collections or pneumothorax. Normal heart size.   CT Head w/o Contrast    Narrative    EXAM: CT HEAD W/O CONTRAST  LOCATION: [facility]  DATE/TIME: 10/28/2021 6:40 PM    INDICATION: Mental status change, unknown cause.  COMPARISON: None.  TECHNIQUE: Routine CT Head without IV contrast. Multiplanar reformats. Dose reduction techniques were used.    FINDINGS:  INTRACRANIAL CONTENTS: No intracranial hemorrhage, extraaxial collection, or mass effect.  No CT evidence of acute infarct. Mild presumed chronic small vessel ischemic changes. Normal ventricles and sulci. Position of cerebellar tonsils is satisfactory.   Sella shows no acute abnormality. Mild motion artifact degrades image quality. Corpus callosum is  satisfactory.    VISUALIZED ORBITS/SINUSES/MASTOIDS: No acute orbital process. Mild to moderate mucosal thickening scattered about the paranasal sinuses. Air-fluid level right maxillary sinus suggests acute inflammatory sinusitis in this region. No middle ear or mastoid   effusion.    BONES/SOFT TISSUES: No fracture of the calvarium or skull base. Satisfactory mineralization. No significant swelling of the facial or scalp tissues.      Impression    IMPRESSION:  1.  No CT evidence for acute intracranial process.  2.  Brain atrophy and presumed chronic microvascular ischemic changes as above. No intracranial mass, mass effect, or acute/evolving infarction.    3.  Air-fluid level right maxillary sinus may indicate acute right maxillary sinus inflammatory change.    4.  Mild motion artifact does degrade image quality.         Armand Huitron DO MPH  [facility] Hospitalist  [address] [place] [address]  10/29/2021

## 2021-10-29 NOTE — PROGRESS NOTES
A BiPAP of  12/6 @ 40% was applied to the pt via the mask for an increase in WOB and/or SOB.  The bridge of the nose is good with gel pad in place.Pt is tolerating it well. Will continue to monitor and assess the pt's current respiratory status and needs.      Karo Eason, RT

## 2021-10-29 NOTE — PROGRESS NOTES
RT Note:    Patient taken off BiPAP around 1pm to 4 LPM NC. Tolerating well, SpO2 high 90s, RR mid 20s.

## 2021-10-29 NOTE — PHARMACY-ADMISSION MEDICATION HISTORY
"Admission medication history interview status for this patient is complete. See [facility] admission navigator for allergy information, prior to admission medications and immunization status.     Medication history interview done, indicate source(s): Patient and wife Annel via phone.  Medication history resources (including written lists, pill bottles, clinic record): Care everywhere.  Pharmacy: [id number].    Changes made to PTA medication list:  Added: all  Changed: none  Reported as Not Taking: none  Removed: none    Actions taken by pharmacist (provider contacted, etc):None     Additional medication history information: gets eye injections once a month. Glimepiride prescribed as 4mg tabs, take two tabs daily but takes one tab daily per his MD recommendation (low Hgb A1C). Hydrochlorothiazide prescribed as 25mg/day but takes 1/2 tab daily per MD recommendation.    Medication reconciliation/reorder completed by provider prior to medication history?  yes  (Y/N)     For patients on insulin therapy: no  How many times do you check your blood glucose per day? bid    Prior to Admission medications    Medication Sig Last Dose Taking? Auth Provider   acyclovir (ZOVIRAX) 400 MG tablet Take 400 mg by mouth 2 times daily 10/29/2021 at Unknown time Yes Unknown, Entered By History   amLODIPine (NORVASC) 10 MG tablet Take 10 mg by mouth daily 10/29/2021 at Unknown time Yes Unknown, Entered By History   atorvastatin (LIPITOR) 20 MG tablet Take 20 mg by mouth daily 10/29/2021 at Unknown time Yes Unknown, Entered By History   azelastine (ASTELIN) 0.1 % nasal spray Spray 1 spray into both nostrils 2 times daily as needed  uses \"once in a while\" Yes Unknown, Entered By History   coenzyme Q-10 200 MG CAPS Take 200 mg by mouth daily 10/28/2021 at Unknown time Yes Unknown, Entered By History   fish oil-omega-3 fatty acids 1000 MG capsule Take 1 g by mouth daily  10/28/2021 at Unknown time Yes Unknown, Entered By History   fluticasone " (FLONASE) 50 MCG/ACT nasal spray Spray 2 sprays into both nostrils 2 times daily  10/28/2021 at Unknown time Yes Unknown, Entered By History   glimepiride (AMARYL) 4 MG tablet Take 4 mg by mouth every morning (before breakfast) 10/28/2021 at Unknown time Yes Unknown, Entered By History   hydrochlorothiazide (HYDRODIURIL) 25 MG tablet Take 12.5 mg by mouth daily Take 1/2 of 25mg tab (12.5mg) daily. 10/28/2021 at Unknown time Yes Unknown, Entered By History   ipratropium (ATROVENT) 0.03 % nasal spray Spray 2 sprays into both nostrils every 8 hours as needed for rhinitis prn - does not use often Yes Unknown, Entered By History   metFORMIN (GLUCOPHAGE-XR) 500 MG 24 hr tablet Take 1,000 mg by mouth 2 times daily (with meals) 10/28/2021 at Unknown time Yes Unknown, Entered By History   Multiple Vitamins-Minerals (CENTRUM SILVER PO) Take 1 tablet by mouth daily 10/28/2021 at Unknown time Yes Unknown, Entered By History   psyllium (METAMUCIL/KONSYL) 58.6 % powder Take 1 Tablespoonful by mouth daily as needed for constipation prn Yes Unknown, Entered By History   sulfamethoxazole-trimethoprim (BACTRIM DS) 800-160 MG tablet Take 1 tablet by mouth three times a week 1tab by mouth three times weekly on Mon, Wed, Fri* 10/27/2021 Yes Unknown, Entered By History

## 2021-10-29 NOTE — ED NOTES
[facility]  ED Nurse Handoff Report    Mikal Nesbitt is a 71 year old male   ED Chief complaint: COVID+  . ED Diagnosis:   Final diagnoses:   Infection due to 2019 novel coronavirus   Hypoxia   Anemia, unspecified type     Allergies:   Allergies   Allergen Reactions     Amoxicillin Hives     Lisinopril      Might be the reason that causes his coughing      Penicillins      Unknown       Code Status: Full Code  Activity level - Baseline/Home:  Independent. Activity Level - Current:   Assist X 2. Lift room needed: No. Bariatric: No   Needed: No   Isolation: Yes. Infection: COVID+    Vital Signs:   Vitals:    10/28/21 1930 10/28/21 1945 10/28/21 1957 10/28/21 2013   BP:    114/52   Pulse: 107 100 93 99   Resp:   30 (!) 33   Temp:    97.7  F (36.5  C)   TempSrc:       SpO2:  90% 100%        Cardiac Rhythm:  ,      Pain level:    Patient confused: Yes. Patient Falls Risk: Yes.   Elimination Status: Has voided   Patient Report - Initial Complaint: COVID+. Focused Assessment: 71 year old male with history of obesity, type 2 diabetes, asthma who presents with Covid concern. The patient reports that he has been experiencing cough, sore throat, fatigue for about 7 days. He is answering questions normally, but has been confused today per his family. For example, he is unable to report the month and appears frustrated. Of note, the patient tested positive for Covid-19 about 7 days ago. His wife and son have also reportedly tested positive. The patient is fully vaccinated for Covid-19. There is no word-finding difficulty or slurred speech.     Review of Systems   Constitutional: Positive for fatigue.   HENT: Positive for sore throat.    Respiratory: Positive for cough.    Neurological: Negative for speech difficulty.   Psychiatric/Behavioral: Positive for confusion.   All other systems reviewed and are negative.     Pt on BiPap.   Tests Performed: Labs, xray. Abnormal Results:   Labs Ordered and Resulted  from Time of ED Arrival to Time of ED Departure   COMPREHENSIVE METABOLIC PANEL - Abnormal       Result Value    Sodium 126 (*)     Potassium 4.9      Chloride 95      Carbon Dioxide (CO2) 22      Anion Gap 9      Urea Nitrogen 28      Creatinine 1.29 (*)     Calcium 8.1 (*)     Glucose 141 (*)     Alkaline Phosphatase 73      AST 28      ALT 22      Protein Total 6.4 (*)     Albumin 3.1 (*)     Bilirubin Total 0.7      GFR Estimate 56 (*)    CBC WITH PLATELETS AND DIFFERENTIAL - Abnormal    WBC Count 4.7      RBC Count 2.11 (*)     Hemoglobin 6.1 (*)     Hematocrit 18.5 (*)     MCV 88      MCH 28.9      MCHC 33.0      RDW 15.9 (*)     Platelet Count 94 (*)     % Neutrophils 90      % Lymphocytes 2      % Monocytes 6      % Eosinophils 0      % Basophils 0      % Immature Granulocytes 2      NRBCs per 100 WBC 0      Absolute Neutrophils 4.3      Absolute Lymphocytes 0.1 (*)     Absolute Monocytes 0.3      Absolute Eosinophils 0.0      Absolute Basophils 0.0      Absolute Immature Granulocytes 0.1 (*)     Absolute NRBCs 0.0     TROPONIN I - Normal    Troponin I <0.015     LACTIC ACID WHOLE BLOOD - Normal    Lactic Acid 1.1     TYPE AND SCREEN, ADULT    ABO/RH(D) O POS      Antibody Screen Negative      SPECIMEN EXPIRATION DATE [id number]     PREPARE RED BLOOD CELLS (UNIT)    CROSSMATCH Compatible      UNIT ABO/RH O Pos      Unit Number [id number]      Unit Status Issued      Blood Component Type Red Blood Cells      Product Code [id number]      CODING SYSTEM [id number]      UNIT TYPE ISBT 5100      ISSUE DATE AND TIME [id number]     PREPARE RED BLOOD CELLS (UNIT)   TRANSFUSE RED BLOOD CELLS (UNIT)   ABO/RH TYPE AND SCREEN     CT Head w/o Contrast   Final Result   IMPRESSION:   1.  No CT evidence for acute intracranial process.   2.  Brain atrophy and presumed chronic microvascular ischemic changes as above. No intracranial mass, mass effect, or acute/evolving infarction.      3.  Air-fluid level right maxillary  sinus may indicate acute right maxillary sinus inflammatory change.      4.  Mild motion artifact does degrade image quality.      XR Chest 1 View   Final Result   IMPRESSION: Severe bilateral airspace opacities consistent with pneumonia. Right IJ port catheter tip in the right atrium. No fluid collections or pneumothorax. Normal heart size.        .   Treatments provided: See MAR, 1 unit PRBCs running  Family Comments: Family aware.  OBS brochure/video discussed/provided to patient:  N/A  ED Medications:   Medications   acetaminophen (TYLENOL) tablet 1,000 mg (1,000 mg Oral Given 10/28/21 1903)   LORazepam (ATIVAN) injection 1 mg (has no administration in time range)   dexamethasone PF (DECADRON) injection 6 mg (6 mg Intravenous Given 10/28/21 1934)     Drips infusing:  Yes - blood  For the majority of the shift, the patient's behavior Green. Interventions performed were N/A.    Sepsis treatment initiated: No     Patient tested for COVID 19 prior to admission: NO - positive already    ED Nurse Name/Phone Number: Michelle Kingsley RN,   8:15 PM  RECEIVING UNIT ED HANDOFF REVIEW    Above ED Nurse Handoff Report was reviewed: Yes  Reviewed by: Arsen Pitt RN on October 28, 2021 at 8:32 PM

## 2021-10-29 NOTE — PROGRESS NOTES
A BiPAP of 12/6  @ 25% was applied to the pt via the mask for an increase in WOB and/or SOB.  The bridge of the nose is intact with gel pad in place.Pt is tolerating it well. Will continue to monitor and assess the pt's current respiratory status and needs.      Lydia Sawyer, RT

## 2021-10-29 NOTE — PLAN OF CARE
ICU End of Shift Summary.  For vital signs and complete assessments, please see documentation flowsheets.     Pertinent assessments: Pt admitted this shift for shortness of breath/Bipap support. Confused to all but self when first admitted, which was consistent with report from ED. Started to clear as the night went on able to recall he was in the hospital and the date/time and president. LSs diminished throughout. Breathing doing well on Bipap with RR in the 20s and sats in the high 90s on 25% FiO2. No anxiety noted since admission to the ED. Tele SR.   Major Shift Events: Pt unable to void when first admitted at 0100 and felt like he had a lot of pressure present. Bladder scanned for 479 mL. Notified Tele-Hub with an order placed to straight cath. Attempted x 3 d/t resistance related to enlarged prostate. 800 mL of urine out at 0145. Up to commode later and was able to void.              - 0143 - Hemoglobin 6.6. Order placed for 1 unit of blood per Dr. Graahm. Given with no complications, recheck at 0800.                 - R port accessed with 1 inch needle.  Plan (Upcoming Events): Continue Bipap support and wean as able. Continue treatment for Covid with Decadron and Remdesivir. PRN as needed for anxiety.   Discharge/Transfer Needs: TBD. Continue ICU cares at this time.    Bedside Shift Report Completed   Bedside Safety Check Completed

## 2021-10-29 NOTE — H&P
[facility]  Hospitalist Admission Note  Name: Mikal Nesbitt    MRN: [id number]  YOB: 1950    Age: 71 year old  Date of admission: 10/28/2021  Primary care provider: Mariaa, [facility] [place]    Chief Complaint: Confusion, shortness of breath    Assessment and Plan:   Acute hypoxemic respiratory failure secondary COVID-19 pneumonia: Positive COVID-19 test on 10/21. He was vaccinated with the Pfizer series. Spouse and son also vaccinated and tested positive for COVID-19. Symptom onset difficult assay, but had loss of sense of smell and taste on 10/18. Has progressive confusion along with fever at home today. Has also been more short of breath, fatigue, sore throat, and frequent cough. Has chronic loose stools. Presenting here with hypoxia requiring initially 2 L oxygen and fever to 101.6  F. Chest x-ray shows severe bilateral opacities secondary to COVID-19 pneumonia. Developed worsening tachypnea while here and based on his chest x-ray suspect he will have significantly worsening hypoxia and respiratory failure over the next 24 hours.  -IV dexamethasone 6 mg daily  -Discussed risk and benefit with IV Remdesevir, spouse agrees to use  -Initiate BiPAP in the ER, monitor closely for intubation needs. I did discuss his critical illness with his spouse and that he very likely could worsen over the next 24 to 48 hours. She says he would want to be full code and not limit any interventions  -Check Covid labs including LFTs while on Remdesevir, monitor creatinine, CRP and D-dimer daily  -Acetaminophen as needed for fevers  -Guaifenesin and Tessalon Perles as needed for cough  -Albuterol nebs as needed for wheezing  -N.p.o. for now due to confusion and BiPAP  -Patient has thrombocytopenia and anemia currently requiring transfusion in the setting of non-Hodgkin's lymphoma. High risk for DVT/PE, but also wants some stability in his counts prior to initiating anticoagulation of any kind  therefore held off on Lovenox and have ordered PCD's to start with    Acute metabolic encephalopathy: Patient has become progressively confused the past couple of days. Suspect major contribution to his COVID-19 and perhaps hypoxia from this. Additionally he is hyponatremic at 124 and has not been taking in good fluids. Consistent with acute metabolic encephalopathy.  -Issues with BiPAP already, low-dose lorazepam 0.5 mg IV every 4 hours as needed, may need to add Haldol    JESS, hyponatremia: Creatinine 1.29 with baseline 1.0. Hyponatremia 126. Fluid and food intake has been poor the last few days in the setting of COVID-19 so likely hypovolemic. He does have trace edema on exam.  -Receiving blood transfusion now  -hypovolemic so will provide some IV fluids while npo with NS 75ml/hr  -BMP in the morning  -Hold his HCTZ    Acute on chronic anemia, chronic thrombocytopenia: Hemoglobin 6.1. Baseline has been in the 7-9 range and occasionally needed transfusion when he was receiving chemotherapy for Hodgkin's lymphoma this summer per spouse. Platelet count 94 which is actually up from mid 50 range. He has a few small bruises, but spouse has not noted any bleeding.  -Transfuse 1 unit RBCs now  -Repeat hemoglobin 2 hours after transfusion  -CBC tomorrow    Follicular non-Hodgkin's lymphoma: Initially diagnosed with follicular NHL in 1999 and completed CHOP chemotherapy. Recurrent positive retroperitoneal biopsy and bone marrow involvement March 2021 and started on bendamustine/Rituxan therapy. Finished fourth cycle in June 2021. He remains on acyclovir 40 mg twice daily and Bactrim -160 MWF prophylaxis. Actually due for follow-up CT this week.  -Trending blood counts as below  -Continue PTA Bactrim and acyclovir prophylaxis  -Resume allopurinol 300 mg daily    WALDEMAR: Unsure if using CPAP or not. Currently requiring BiPAP.    Obesity: BMI 32 per outside records.    Type II DM: Most recent A1c 4.1, but this is in the  setting of significant anemia. He is on Metformin XR and glipizide. His spouse reports recent reduction of these medications due to the A1c, but she is not sure what the doses are. Blood glucose is 141 in the ER.  -Medium dose line scale insulin every 4 hours while n.p.o.  -Anticipate hyperglycemia with dexamethasone and likely need to add either NPH or glargine depending on how high blood sugars go  -Hold Metformin and glipizide    Left carotid artery stenosis: Per spouse they have been monitoring this, but no intervention needed to this point.    HLD: Okay to resume atorvastatin 20 mg daily if taking p.o. as long as LFTs remain stable.    HTN: PTA on amlodipine 10 mg daily and HCTZ 25 mg daily. Blood pressure not significantly elevated in the ER.  -Resume amlodipine with hold parameters  -Hold HCTZ for hyponatremia      DVT Prophylaxis: Pneumatic Compression Devices, holding Lovenox initially given his thrombocytopenia and need for RBC transfusion  Code Status: Full Code - discussed with patient's spouse as he cannot contribute to this conversation now. She says he has never indicated that he would want any limited therapies.   FEN: N.p.o. due to confusion on BiPAP, NS at 75 ml/hr  Discharge Dispo: TBD  Estimated Disch Date / # of Days until Disch: Admit inpatient to the ICU due to BiPAP needs and progressive hypoxemic respiratory failure. Critically ill. Anticipate likely prolonged hospitalization      History of Present Illness:  Mikal Nesbitt is a 71 year old male with PMH including follicular non-Hodgkin's lymphoma initially treated in 1999 with CHOP therapy and then with recurrence he received bendamustine/Rituxan therapy through June of this year where he required intermittent transfusion for anemia and thrombocytopenia, type II DM, asthma, obesity, HTN, HLD, neuropathy, fatty liver, and WALDEMAR who tested positive for COVID-19 7 days ago who presents with his spouse for confusion and progressive shortness of  breath. He can provide very little history as he is not answering many questions due to his confusion. She states that for the past few days he has become increasingly confused. He has looked more short of breath and did report some difficulty breathing. He was febrile today so she brought in for evaluation. He has not felt very well for the past 3 weeks or so so it is difficult to say when his symptoms started. She does note that he lost sense of smell and taste about 10 days ago, 3 days prior to being tested. He is fully vaccinated for COVID-19 with the Pfizer series. She and her son also tested positive for COVID-19 and they are also vaccinated. He finished chemotherapy in June and is actually due for a follow-up CT scan this week. He has chronic loose stools, unchanged from baseline. He has not been eating or drinking very much this past week with feeling ill. She has not seen him have any vomiting and he has not been endorsing any abdominal pain or chest pain.     History obtained from patient (limited), patient's spouse, medical record, and from Dr. Burton in the emergency department. Blood pressure 134/44, heart rate 98-1 04, temperature 101.6  F, oxygen 90-94% initially on 2 L. Initial labs show sodium 126, potassium 4.9, chloride 95, bicarb 22, BUN 28, creatinine 1.29. Albumin 3.1 otherwise LFTs normal. WBC 4.7, hemoglobin 6.1, platelet count 94. Blood glucose is 141. Lactic acid normal at 1.1 and troponin is undetectable. EKG shows sinus tachycardia with LAFB. Noncontrast head CT n all old for sure egative for any acute pathology. Chest x-ray shows severe bilateral opacities along with his right IJ port. 1 unit RBCs has been ordered for transfusion. He received 6 mg IV dexamethasone and 1000 mg acetaminophen. He became progressively confused and tachypneic. Oxygen saturations remained low and he has been started on BiPAP for respiratory failure. He will be admitted to the ICU. I will provide signout to  the tele-ICU physician upon admission.     Past Medical History reviewed:  Past Medical History:   Diagnosis Date     Anemia      Fatty liver      Follicular non-Hodgkin's lymphoma (H)     most recent chemo summer 2021     HTN (hypertension)      Left carotid artery stenosis      Obesity      WALDEMAR (obstructive sleep apnea)      Thrombocytopenia (H)      Type 2 diabetes mellitus (H)      Past Surgical History reviewed:  Past Surgical History:   Procedure Laterality Date     ADENOIDECTOMY       SINUS SURGERY       TONSILLECTOMY       VASECTOMY       Social History reviewed:  Social History     Tobacco Use     Smoking status: Former Smoker     Types: Cigars     Quit date: 1970     Years since quittin.8   Substance Use Topics     Alcohol use: Not Currently     Social History     Social History Narrative     Not on file     Family History reviewed:  Family History   Problem Relation Age of Onset     Diabetes Mother      Parkinsonism Father         parkinson's disease     Allergies:  Allergies   Allergen Reactions     Amoxicillin Hives     Lisinopril      Might be the reason that causes his coughing      Penicillins      Unknown     Medications:  Amlodipine 10 mg daily  HCTZ 25 mg daily  Allopurinol 3 mg daily  Bactrim -160 every MWF  Acyclovir 400 mg twice daily  Atorvastatin 40 mg daily  Flonase  Metformin dose unknown  Amaryl dose unknown    Review of Systems:  Attempted, but unable to complete due to patient's encephalopathy     Physical Exam:  Blood pressure 123/52, pulse 95, temperature 97.7  F (36.5  C), resp. rate (!) 33, SpO2 99 %.  Wt Readings from Last 1 Encounters:   No data found for Wt     Exam:  Constitutional: Awake, appears in mild distress  Eyes: sclera white   HEENT: atraumatic, dry mucous membrane  Respiratory: He is in mild to moderate respiratory distress with tachypnea. He has bilateral diffuse crackles without wheeze. He is being placed on BiPAP now continues to pull at the  mask  Cardiovascular: R regular tachycardia, 1/6 systolic murmur  GI: Obese, non-tender, not distended, bowel sounds present  Skin: Few scattered ecchymoses on extremities  Musculoskeletal/extremities: atraumatic, no major deformities. Trace bilateral lower extremity edema  Neurologic: Alert, but not oriented to place or time, he keeps grabbing at the oxygen mask and is only partially redirectable, not following commands to do full strength assessment  Psychiatric: Anxious, not cooperative    Lab and imaging data personally reviewed:  Labs:  Recent Labs   Lab 10/28/21  1740   WBC 4.7   HGB 6.1*   HCT 18.5*   MCV 88   PLT 94*     Recent Labs   Lab 10/28/21  1740   *   POTASSIUM 4.9   CHLORIDE 95   CO2 22   ANIONGAP 9   *   BUN 28   CR 1.29*   GFRESTIMATED 56*   LILIAN 8.1*   PROTTOTAL 6.4*   ALBUMIN 3.1*   BILITOTAL 0.7   ALKPHOS 73   AST 28   ALT 22     Recent Labs   Lab 10/28/21  1740   LACT 1.1     Recent Labs   Lab 10/28/21  1740   TROPONIN <0.015       EKG: Sinus tachycardia, LAFB, no ST elevation or depression    Imaging:  Recent Results (from the past 24 hour(s))   XR Chest 1 View    Narrative    EXAM: XR CHEST 1 VIEW  LOCATION: [facility]  DATE/TIME: 10/28/2021 6:50 PM    INDICATION: covid  COMPARISON: None.      Impression    IMPRESSION: Severe bilateral airspace opacities consistent with pneumonia. Right IJ port catheter tip in the right atrium. No fluid collections or pneumothorax. Normal heart size.   CT Head w/o Contrast    Narrative    EXAM: CT HEAD W/O CONTRAST  LOCATION: [facility]  DATE/TIME: 10/28/2021 6:40 PM    INDICATION: Mental status change, unknown cause.  COMPARISON: None.  TECHNIQUE: Routine CT Head without IV contrast. Multiplanar reformats. Dose reduction techniques were used.    FINDINGS:  INTRACRANIAL CONTENTS: No intracranial hemorrhage, extraaxial collection, or mass effect.  No CT evidence of acute infarct. Mild presumed chronic  small vessel ischemic changes. Normal ventricles and sulci. Position of cerebellar tonsils is satisfactory.   Sella shows no acute abnormality. Mild motion artifact degrades image quality. Corpus callosum is satisfactory.    VISUALIZED ORBITS/SINUSES/MASTOIDS: No acute orbital process. Mild to moderate mucosal thickening scattered about the paranasal sinuses. Air-fluid level right maxillary sinus suggests acute inflammatory sinusitis in this region. No middle ear or mastoid   effusion.    BONES/SOFT TISSUES: No fracture of the calvarium or skull base. Satisfactory mineralization. No significant swelling of the facial or scalp tissues.      Impression    IMPRESSION:  1.  No CT evidence for acute intracranial process.  2.  Brain atrophy and presumed chronic microvascular ischemic changes as above. No intracranial mass, mass effect, or acute/evolving infarction.    3.  Air-fluid level right maxillary sinus may indicate acute right maxillary sinus inflammatory change.    4.  Mild motion artifact does degrade image quality.       Rusty Dowd MD  Hospitalist  [facility]

## 2021-10-29 NOTE — PROGRESS NOTES
Pt transported from ED to ICU without incident. FIO2 down to 25%, sats mid 90's. RT will monitor.    Karo Eason RT

## 2021-10-29 NOTE — PROVIDER NOTIFICATION
DATE:  10/29/2021   TIME OF RECEIPT FROM LAB:  143  LAB TEST:  hgb  LAB VALUE:  6.6  RESULTS GIVEN WITH READ-BACK TO (PROVIDER):  Tele ICU  TIME LAB VALUE REPORTED TO PROVIDER:   144

## 2021-10-30 LAB
ALBUMIN SERPL-MCNC: 2.6 G/DL (ref 3.4–5)
ALP SERPL-CCNC: 62 U/L (ref 40–150)
ALT SERPL W P-5'-P-CCNC: 22 U/L (ref 0–70)
ANION GAP SERPL CALCULATED.3IONS-SCNC: 5 MMOL/L (ref 3–14)
AST SERPL W P-5'-P-CCNC: 18 U/L (ref 0–45)
BILIRUB SERPL-MCNC: 0.4 MG/DL (ref 0.2–1.3)
BUN SERPL-MCNC: 26 MG/DL (ref 7–30)
CALCIUM SERPL-MCNC: 8.1 MG/DL (ref 8.5–10.1)
CHLORIDE BLD-SCNC: 109 MMOL/L (ref 94–109)
CO2 SERPL-SCNC: 21 MMOL/L (ref 20–32)
CREAT SERPL-MCNC: 0.88 MG/DL (ref 0.66–1.25)
CRP SERPL-MCNC: 56.5 MG/L (ref 0–8)
D DIMER PPP FEU-MCNC: 1.58 UG/ML FEU (ref 0–0.5)
ERYTHROCYTE [DISTWIDTH] IN BLOOD BY AUTOMATED COUNT: 17.8 % (ref 10–15)
GFR SERPL CREATININE-BSD FRML MDRD: 88 ML/MIN/1.73M2
GLUCOSE BLD-MCNC: 190 MG/DL (ref 70–99)
GLUCOSE BLDC GLUCOMTR-MCNC: 159 MG/DL (ref 70–99)
GLUCOSE BLDC GLUCOMTR-MCNC: 170 MG/DL (ref 70–99)
GLUCOSE BLDC GLUCOMTR-MCNC: 223 MG/DL (ref 70–99)
GLUCOSE BLDC GLUCOMTR-MCNC: 235 MG/DL (ref 70–99)
GLUCOSE BLDC GLUCOMTR-MCNC: 235 MG/DL (ref 70–99)
GLUCOSE BLDC GLUCOMTR-MCNC: 271 MG/DL (ref 70–99)
GLUCOSE BLDC GLUCOMTR-MCNC: 309 MG/DL (ref 70–99)
GLUCOSE BLDC GLUCOMTR-MCNC: 350 MG/DL (ref 70–99)
HCT VFR BLD AUTO: 25.9 % (ref 40–53)
HGB BLD-MCNC: 8.1 G/DL (ref 13.3–17.7)
MCH RBC QN AUTO: 28.1 PG (ref 26.5–33)
MCHC RBC AUTO-ENTMCNC: 31.3 G/DL (ref 31.5–36.5)
MCV RBC AUTO: 90 FL (ref 78–100)
PLATELET # BLD AUTO: 89 10E3/UL (ref 150–450)
POTASSIUM BLD-SCNC: 4.9 MMOL/L (ref 3.4–5.3)
PROT SERPL-MCNC: 5.5 G/DL (ref 6.8–8.8)
RBC # BLD AUTO: 2.88 10E6/UL (ref 4.4–5.9)
SODIUM SERPL-SCNC: 135 MMOL/L (ref 133–144)
WBC # BLD AUTO: 3.8 10E3/UL (ref 4–11)

## 2021-10-30 PROCEDURE — 99233 SBSQ HOSP IP/OBS HIGH 50: CPT | Performed by: HOSPITALIST

## 2021-10-30 PROCEDURE — 250N000009 HC RX 250: Performed by: INTERNAL MEDICINE

## 2021-10-30 PROCEDURE — 250N000013 HC RX MED GY IP 250 OP 250 PS 637: Performed by: INTERNAL MEDICINE

## 2021-10-30 PROCEDURE — 86140 C-REACTIVE PROTEIN: CPT | Performed by: INTERNAL MEDICINE

## 2021-10-30 PROCEDURE — 250N000011 HC RX IP 250 OP 636: Performed by: HOSPITALIST

## 2021-10-30 PROCEDURE — 85027 COMPLETE CBC AUTOMATED: CPT | Performed by: INTERNAL MEDICINE

## 2021-10-30 PROCEDURE — 250N000011 HC RX IP 250 OP 636: Performed by: INTERNAL MEDICINE

## 2021-10-30 PROCEDURE — 200N000001 HC R&B ICU

## 2021-10-30 PROCEDURE — 80053 COMPREHEN METABOLIC PANEL: CPT | Performed by: INTERNAL MEDICINE

## 2021-10-30 PROCEDURE — 258N000003 HC RX IP 258 OP 636: Performed by: INTERNAL MEDICINE

## 2021-10-30 PROCEDURE — 85379 FIBRIN DEGRADATION QUANT: CPT | Performed by: INTERNAL MEDICINE

## 2021-10-30 RX ORDER — ATORVASTATIN CALCIUM 20 MG/1
20 TABLET, FILM COATED ORAL DAILY
Status: DISCONTINUED | OUTPATIENT
Start: 2021-10-30 | End: 2021-10-30

## 2021-10-30 RX ADMIN — ATORVASTATIN CALCIUM 20 MG: 20 TABLET, FILM COATED ORAL at 20:19

## 2021-10-30 RX ADMIN — SODIUM CHLORIDE: 9 INJECTION, SOLUTION INTRAVENOUS at 07:17

## 2021-10-30 RX ADMIN — ACYCLOVIR 400 MG: 200 CAPSULE ORAL at 08:30

## 2021-10-30 RX ADMIN — INSULIN ASPART 5 UNITS: 100 INJECTION, SOLUTION INTRAVENOUS; SUBCUTANEOUS at 20:20

## 2021-10-30 RX ADMIN — SODIUM CHLORIDE 50 ML: 9 INJECTION, SOLUTION INTRAVENOUS at 18:03

## 2021-10-30 RX ADMIN — FLUTICASONE PROPIONATE 1 SPRAY: 50 SPRAY, METERED NASAL at 11:06

## 2021-10-30 RX ADMIN — ACYCLOVIR 400 MG: 200 CAPSULE ORAL at 20:19

## 2021-10-30 RX ADMIN — INSULIN ASPART 2 UNITS: 100 INJECTION, SOLUTION INTRAVENOUS; SUBCUTANEOUS at 03:38

## 2021-10-30 RX ADMIN — INSULIN ASPART 1 UNITS: 100 INJECTION, SOLUTION INTRAVENOUS; SUBCUTANEOUS at 08:27

## 2021-10-30 RX ADMIN — REMDESIVIR 100 MG: 100 INJECTION, POWDER, LYOPHILIZED, FOR SOLUTION INTRAVENOUS at 18:00

## 2021-10-30 RX ADMIN — AMLODIPINE BESYLATE 10 MG: 10 TABLET ORAL at 08:30

## 2021-10-30 RX ADMIN — FLUTICASONE PROPIONATE 1 SPRAY: 50 SPRAY, METERED NASAL at 21:07

## 2021-10-30 RX ADMIN — ENOXAPARIN SODIUM 40 MG: 40 INJECTION SUBCUTANEOUS at 11:06

## 2021-10-30 RX ADMIN — FAMOTIDINE 20 MG: 10 INJECTION, SOLUTION INTRAVENOUS at 02:08

## 2021-10-30 RX ADMIN — FAMOTIDINE 20 MG: 10 INJECTION, SOLUTION INTRAVENOUS at 14:18

## 2021-10-30 RX ADMIN — LORAZEPAM 0.5 MG: 2 INJECTION INTRAMUSCULAR; INTRAVENOUS at 20:19

## 2021-10-30 RX ADMIN — DEXAMETHASONE SODIUM PHOSPHATE 6 MG: 10 INJECTION INTRAMUSCULAR; INTRAVENOUS at 14:18

## 2021-10-30 RX ADMIN — INSULIN ASPART 1 UNITS: 100 INJECTION, SOLUTION INTRAVENOUS; SUBCUTANEOUS at 13:15

## 2021-10-30 RX ADMIN — INSULIN ASPART 3 UNITS: 100 INJECTION, SOLUTION INTRAVENOUS; SUBCUTANEOUS at 00:11

## 2021-10-30 RX ADMIN — INSULIN ASPART 2 UNITS: 100 INJECTION, SOLUTION INTRAVENOUS; SUBCUTANEOUS at 18:01

## 2021-10-30 ASSESSMENT — ACTIVITIES OF DAILY LIVING (ADL)
ADLS_ACUITY_SCORE: 11
ADLS_ACUITY_SCORE: 9
ADLS_ACUITY_SCORE: 9
ADLS_ACUITY_SCORE: 11
ADLS_ACUITY_SCORE: 9
ADLS_ACUITY_SCORE: 9
ADLS_ACUITY_SCORE: 11
ADLS_ACUITY_SCORE: 9
ADLS_ACUITY_SCORE: 10
ADLS_ACUITY_SCORE: 9
ADLS_ACUITY_SCORE: 11
ADLS_ACUITY_SCORE: 9
ADLS_ACUITY_SCORE: 10
ADLS_ACUITY_SCORE: 9
ADLS_ACUITY_SCORE: 11
ADLS_ACUITY_SCORE: 11
ADLS_ACUITY_SCORE: 9
ADLS_ACUITY_SCORE: 9
ADLS_ACUITY_SCORE: 11
ADLS_ACUITY_SCORE: 11
ADLS_ACUITY_SCORE: 10
ADLS_ACUITY_SCORE: 9
ADLS_ACUITY_SCORE: 11
ADLS_ACUITY_SCORE: 11

## 2021-10-30 ASSESSMENT — MIFFLIN-ST. JEOR: SCORE: 1462

## 2021-10-30 NOTE — PLAN OF CARE
ICU End of Shift Summary.  For vital signs and complete assessments, please see documentation flowsheets.     Pertinent assessments: patient remained on NC overnight. Decreased to 1.5L. maintaining sats > 90%. A&O. One dose of prn ativan given at bedtime. Lung sounds diminished and coarse. SR. Producing adequate urine and had a BM overnight.   Major Shift Events: decreased O2 needs to 1.5L NC.   Plan (Upcoming Events): TBD  Discharge/Transfer Needs: TBD     Bedside Shift Report Completed : y  Bedside Safety Check Completed: y

## 2021-10-30 NOTE — PROGRESS NOTES
[facility]    Hospitalist Progress Note  Name: Mikal Nesbitt    MRN: [id number]  Provider:  Armand Huitron DO MPH  Date of Service: 10/30/2021    Summary of Stay: Mikal Nesbitt is a 71 year old male with a history of Follicular non Hodgkin's lymphoma, WALDEMAR, DM2, Obesity, left HASEEB, HTN, HLP admitted on 10/28/2021 with shortness of breath and confusion.  Tested positive for COVID-19 about 7 days ago, appears symptoms started to some degree around 3 weeks ago.  Developed worsening shortness of breath and confusion over the subsequent several days prompting his presentation to the ED.    Blood pressure 134/44, heart rate 98, temperature 101.6  F, oxygen 90-94% initially on 2 L. Initial labs show sodium 126, potassium 4.9, chloride 95, bicarb 22, BUN 28, creatinine 1.29. Albumin 3.1 otherwise LFTs normal. WBC 4.7, hemoglobin 6.1, platelet count 94. Blood glucose is 141. Lactic acid normal at 1.1 and troponin is undetectable. EKG shows sinus tachycardia with LAFB. Noncontrast head CT n all old for sure egative for any acute pathology. Chest x-ray shows severe bilateral opacities along with his right IJ port.     He was placed on BiPAP and admitted to the ICU.  He received 2 units of packed red blood cells.  He received 6 mg IV dexamethasone and remdesivir and tocilizumab.  No significant issues overnight and now on NC.    Problem List:   1. Acute hypoxic respiratory failure secondary to COVID-19 pneumonia: Initially on BiPAP but quickly improved and now on 1-2L NC.  Taking dexamethasone for 10 days and remdesivir for 5 days.  Received tocilizumab 10/30.  Start enoxaparin given CBC is now stable and no bleeding reports.  2. Acute metabolic encephalopathy: Likely due to hypoxia and COVID-19 infection with possible contribution from hyponatremia.  Mental status does appear to be improving.  3. Acute kidney injury: Creatinine is improving.  This is likely prerenal and related to limited oral intake over the  past several days.  We will continue normal saline for now, can discontinue when he comes off BiPAP and oral intake is attempted.  Hold PTA hydrochlorothiazide.  4. Pancytopenia: Baseline hemoglobin is 7-9.  Was 6.1 on admission and required 2 units of packed red blood cells with hemoglobin now greater than 8.  No reports of bleeding.  Recheck CBC tomorrow, no further transfusions for now.  5. Follicular non-Hodgkin's lymphoma: Diagnosed in 1999 and completed CHOP chemotherapy.  Had recurrent positive retroperitoneal biopsy and bone marrow involvement this year requiring initiation of bendamustine/Rituxan.  Dee on acyclovir and Bactrim for prophylaxis, continue these for now.  Continue prophylactic allopurinol.  6. WALDEMAR: CPAP.  7. Diabetes mellitus: Recent hemoglobin A1c 4.1 but now at risk for steroid-induced hyperglycemia.  Blood sugars currently in the 100-200s.  Hold prior to admission Metformin and glipizide and increase NPH to 22 units to be given with dexamethasone and continue medium sliding scale insulin.  Add novolog 1 unit(s) per 15 grams carbohydrate.  8. Left carotid artery stenosis: No intervention.  9. Hyperlipidemia: Continue atorvastatin, monitor liver function test while on remdesivir.  10. Hypertension: Blood pressure slightly low.  Continue amlodipine (with hold parameters) but hold prior to admission hydrochlorothiazide.    DVT Prophylaxis: Start Lovenox 40 mg subcutaneous daily.  Code Status: Full Code  Diet: Regular Diet Adult    Finch Catheter: Not present  Disposition: Expected discharge in 2-3 days to TBD. Goals prior to discharge include manage respiratory failure. Transfer to F.  Incidental Findings: None.  Family updated today: Not today.        Interval History   The patient reports doing well. No chest pain but still coughing and shortness of breath. No nausea, vomiting, diarrhea, constipation. No fevers. No other specific complaints identified.     -Data reviewed today: I  personally reviewed all new labs and imaging results over the last 24 hours.     Physical Exam   Temp: (!) 96.6  F (35.9  C) Temp src: Oral BP: 134/63 Pulse: 67   Resp: 18 SpO2: 93 % O2 Device: Nasal cannula Oxygen Delivery: 1 LPM  Vitals:    10/29/21 0100 10/30/21 0540   Weight: 81.6 kg (179 lb 14.3 oz) 78.6 kg (173 lb 4.5 oz)     Vital Signs with Ranges  Temp:  [96.6  F (35.9  C)-99  F (37.2  C)] 96.6  F (35.9  C)  Pulse:  [56-86] 67  Resp:  [14-73] 18  BP: ()/(40-92) 134/63  FiO2 (%):  [25 %] 25 %  SpO2:  [62 %-100 %] 93 %  I/O last 3 completed shifts:  In: 2203 [P.O.:150; I.V.:2053]  Out: 4400 [Urine:4400]    GENERAL: No apparent distress. Awake, alert, and fully oriented. On BiPAP.  HEENT: Normocephalic, atraumatic. Extraocular movements intact.  CARDIOVASCULAR: Regular rate and rhythm without murmurs or rubs. No S3.  PULMONARY: Clear bilaterally.  GASTROINTESTINAL: Soft, non-tender, non-distended. Bowel sounds normoactive.   EXTREMITIES: No cyanosis or clubbing. No edema.  NEUROLOGICAL: CN 2-12 grossly intact, no focal neurological deficits.  DERMATOLOGICAL: No rash, ulcer, bruising, nor jaundice.     Medications     - MEDICATION INSTRUCTIONS -         remdesivir  100 mg Intravenous Q24H    And     sodium chloride 0.9%  50 mL Intravenous Q24H     acyclovir  400 mg Oral BID     amLODIPine  10 mg Oral Daily     atorvastatin  20 mg Oral QPM     dexamethasone  6 mg Intravenous Daily     famotidine  20 mg Intravenous Q12H     fluticasone  1 spray Both Nostrils Daily     insulin aspart   Subcutaneous TID AC     insulin aspart  1-6 Units Subcutaneous Q4H     insulin NPH  22 Units Subcutaneous Daily     sodium chloride (PF)  3 mL Intracatheter Q8H     sulfamethoxazole-trimethoprim  2 tablet Oral Once per day on Mon Wed Fri     Data     Laboratory:  Recent Labs   Lab 10/30/21  0535 10/29/21  0816 10/29/21  0542 10/29/21  0132 10/28/21  1740   WBC 3.8*  --  2.1*  --  4.7   HGB 8.1* 7.0* 7.0*   < > 6.1*   HCT 25.9*   --  21.9*  --  18.5*   MCV 90  --  91  --  88   PLT 89*  --  66*  --  94*    < > = values in this interval not displayed.     Recent Labs   Lab 10/30/21  0742 10/30/21  0535 10/30/21  0337 10/29/21  0822 10/29/21  0542 10/29/21  0437 10/29/21  0132 10/29/21  0114 10/28/21  1740   NA  --  135  --   --  131*  --  127*  --  126*   POTASSIUM  --  4.9  --   --  4.7  --   --   --  4.9   CHLORIDE  --  109  --   --  103  --   --   --  95   CO2  --  21  --   --  20  --   --   --  22   ANIONGAP  --  5  --   --  8  --   --   --  9   * 190* 223*   < > 263*   < >  --    < > 141*   BUN  --  26  --   --  28  --   --   --  28   CR  --  0.88  --   --  1.07  --   --   --  1.29*   GFRESTIMATED  --  88  --   --  70  --   --   --  56*   LILIAN  --  8.1*  --   --  7.8*  --   --   --  8.1*    < > = values in this interval not displayed.     No results for input(s): CULT in the last 168 hours.    Imaging:  No results found for this or any previous visit (from the past 24 hour(s)).      Armand Huitron DO MPH  [facility] Hospitalist  [address] [place] [address]  10/30/2021

## 2021-10-30 NOTE — PROGRESS NOTES
BiPAP on stby in room. No issues today. Remains on 1-3 LPM NC. Awaiting transfer to [facility] - [facility].     Mallory Scott, LRT, BSRT-RRT

## 2021-10-30 NOTE — PROGRESS NOTES
"End of shift summary- 0700-1900  Dx: Hypoxia/Covid   A/O: x4  Diet: Regular - fair appetite. PO fluids  Transfer: A1 - generalized weakness   Bathroom: Standing with urinal or bedside commode. Medium BM this evening  Pain: Denies   Respiratory: 1-3L via NC today. Was on BiPAP yesterday - in room on standby. Productive cough -  intermittent   Treatment: Remdesivir, Decadron, 02, q4 BS checks with sliding scale insulin and carb count. 100-200's.   Discharge Plans: tbd - from home with wife. Will be transferring out of ICU once bed opens up.     -updated franck (wife) on phone on status, [phone]    Blood pressure 130/58, pulse 74, temperature 98.6  F (37  C), resp. rate (!) 46, height 1.626 m (5' 4\"), weight 78.6 kg (173 lb 4.5 oz), SpO2 93 %.   "

## 2021-10-30 NOTE — PLAN OF CARE
ICU End of Shift Summary.  For vital signs and complete assessments, please see documentation flowsheets.     Pertinent assessments: patient remained on bipap, till approx 1300  To NC 4 Lpm.  Tolerated well, patient is alert oriented x 4, lopez, cms intact, c/o tremor in hands, patient stated this started 1-2 weeks ago. Occasional cough nonproductive.  Lungs diminished,  Vss, able to swallow po meds, cont to deny pain and sob    Major Shift Events: off bipap  Plan (Upcoming Events): cont poc  Discharge/Transfer Needs: tbd    Bedside Shift Report Completed : yes  Bedside Safety Check Completed: yes

## 2021-10-31 LAB
ALBUMIN SERPL-MCNC: 2.8 G/DL (ref 3.4–5)
ALP SERPL-CCNC: 62 U/L (ref 40–150)
ALT SERPL W P-5'-P-CCNC: 33 U/L (ref 0–70)
ANION GAP SERPL CALCULATED.3IONS-SCNC: 5 MMOL/L (ref 3–14)
AST SERPL W P-5'-P-CCNC: 27 U/L (ref 0–45)
BILIRUB SERPL-MCNC: 0.5 MG/DL (ref 0.2–1.3)
BUN SERPL-MCNC: 23 MG/DL (ref 7–30)
CALCIUM SERPL-MCNC: 8.2 MG/DL (ref 8.5–10.1)
CHLORIDE BLD-SCNC: 105 MMOL/L (ref 94–109)
CO2 SERPL-SCNC: 23 MMOL/L (ref 20–32)
CREAT SERPL-MCNC: 0.85 MG/DL (ref 0.66–1.25)
CRP SERPL-MCNC: 23.5 MG/L (ref 0–8)
D DIMER PPP FEU-MCNC: 1.28 UG/ML FEU (ref 0–0.5)
ERYTHROCYTE [DISTWIDTH] IN BLOOD BY AUTOMATED COUNT: 17.4 % (ref 10–15)
GFR SERPL CREATININE-BSD FRML MDRD: 89 ML/MIN/1.73M2
GLUCOSE BLD-MCNC: 200 MG/DL (ref 70–99)
GLUCOSE BLDC GLUCOMTR-MCNC: 175 MG/DL (ref 70–99)
GLUCOSE BLDC GLUCOMTR-MCNC: 187 MG/DL (ref 70–99)
GLUCOSE BLDC GLUCOMTR-MCNC: 191 MG/DL (ref 70–99)
GLUCOSE BLDC GLUCOMTR-MCNC: 303 MG/DL (ref 70–99)
GLUCOSE BLDC GLUCOMTR-MCNC: 88 MG/DL (ref 70–99)
HCT VFR BLD AUTO: 26.6 % (ref 40–53)
HGB BLD-MCNC: 8.3 G/DL (ref 13.3–17.7)
MCH RBC QN AUTO: 27.9 PG (ref 26.5–33)
MCHC RBC AUTO-ENTMCNC: 31.2 G/DL (ref 31.5–36.5)
MCV RBC AUTO: 90 FL (ref 78–100)
PLATELET # BLD AUTO: 91 10E3/UL (ref 150–450)
POTASSIUM BLD-SCNC: 4.6 MMOL/L (ref 3.4–5.3)
PROT SERPL-MCNC: 5.6 G/DL (ref 6.8–8.8)
RBC # BLD AUTO: 2.97 10E6/UL (ref 4.4–5.9)
SODIUM SERPL-SCNC: 133 MMOL/L (ref 133–144)
WBC # BLD AUTO: 3.9 10E3/UL (ref 4–11)

## 2021-10-31 PROCEDURE — 80053 COMPREHEN METABOLIC PANEL: CPT | Performed by: INTERNAL MEDICINE

## 2021-10-31 PROCEDURE — 258N000003 HC RX IP 258 OP 636: Performed by: INTERNAL MEDICINE

## 2021-10-31 PROCEDURE — 99233 SBSQ HOSP IP/OBS HIGH 50: CPT | Performed by: HOSPITALIST

## 2021-10-31 PROCEDURE — 250N000013 HC RX MED GY IP 250 OP 250 PS 637: Performed by: INTERNAL MEDICINE

## 2021-10-31 PROCEDURE — 250N000011 HC RX IP 250 OP 636: Performed by: HOSPITALIST

## 2021-10-31 PROCEDURE — 85027 COMPLETE CBC AUTOMATED: CPT | Performed by: INTERNAL MEDICINE

## 2021-10-31 PROCEDURE — 250N000011 HC RX IP 250 OP 636: Performed by: INTERNAL MEDICINE

## 2021-10-31 PROCEDURE — 120N000001 HC R&B MED SURG/OB

## 2021-10-31 PROCEDURE — 85379 FIBRIN DEGRADATION QUANT: CPT | Performed by: INTERNAL MEDICINE

## 2021-10-31 PROCEDURE — 86140 C-REACTIVE PROTEIN: CPT | Performed by: INTERNAL MEDICINE

## 2021-10-31 PROCEDURE — 250N000009 HC RX 250: Performed by: INTERNAL MEDICINE

## 2021-10-31 PROCEDURE — 250N000013 HC RX MED GY IP 250 OP 250 PS 637: Performed by: HOSPITALIST

## 2021-10-31 PROCEDURE — 250N000012 HC RX MED GY IP 250 OP 636 PS 637: Performed by: HOSPITALIST

## 2021-10-31 RX ORDER — ACYCLOVIR 400 MG/1
400 TABLET ORAL 2 TIMES DAILY
Status: DISCONTINUED | OUTPATIENT
Start: 2021-10-31 | End: 2021-11-02 | Stop reason: HOSPADM

## 2021-10-31 RX ORDER — GLIMEPIRIDE 4 MG/1
4 TABLET ORAL
Status: DISCONTINUED | OUTPATIENT
Start: 2021-10-31 | End: 2021-11-02 | Stop reason: HOSPADM

## 2021-10-31 RX ORDER — AMLODIPINE BESYLATE 10 MG/1
10 TABLET ORAL DAILY
Status: DISCONTINUED | OUTPATIENT
Start: 2021-10-31 | End: 2021-10-31

## 2021-10-31 RX ADMIN — ATORVASTATIN CALCIUM 20 MG: 20 TABLET, FILM COATED ORAL at 22:46

## 2021-10-31 RX ADMIN — REMDESIVIR 100 MG: 100 INJECTION, POWDER, LYOPHILIZED, FOR SOLUTION INTRAVENOUS at 17:01

## 2021-10-31 RX ADMIN — GLIMEPIRIDE 4 MG: 4 TABLET ORAL at 08:52

## 2021-10-31 RX ADMIN — ENOXAPARIN SODIUM 40 MG: 40 INJECTION SUBCUTANEOUS at 09:35

## 2021-10-31 RX ADMIN — FLUTICASONE PROPIONATE 1 SPRAY: 50 SPRAY, METERED NASAL at 08:47

## 2021-10-31 RX ADMIN — DEXAMETHASONE SODIUM PHOSPHATE 6 MG: 10 INJECTION INTRAMUSCULAR; INTRAVENOUS at 12:50

## 2021-10-31 RX ADMIN — INSULIN HUMAN 30 UNITS: 100 INJECTION, SUSPENSION SUBCUTANEOUS at 12:50

## 2021-10-31 RX ADMIN — INSULIN ASPART 1 UNITS: 100 INJECTION, SOLUTION INTRAVENOUS; SUBCUTANEOUS at 03:48

## 2021-10-31 RX ADMIN — FAMOTIDINE 20 MG: 10 INJECTION, SOLUTION INTRAVENOUS at 17:03

## 2021-10-31 RX ADMIN — INSULIN ASPART 1 UNITS: 100 INJECTION, SOLUTION INTRAVENOUS; SUBCUTANEOUS at 00:12

## 2021-10-31 RX ADMIN — ACYCLOVIR 400 MG: 200 CAPSULE ORAL at 08:47

## 2021-10-31 RX ADMIN — AMLODIPINE BESYLATE 10 MG: 10 TABLET ORAL at 08:47

## 2021-10-31 RX ADMIN — SODIUM CHLORIDE 50 ML: 9 INJECTION, SOLUTION INTRAVENOUS at 17:03

## 2021-10-31 RX ADMIN — ACYCLOVIR 400 MG: 400 TABLET ORAL at 22:46

## 2021-10-31 RX ADMIN — FAMOTIDINE 20 MG: 10 INJECTION, SOLUTION INTRAVENOUS at 03:42

## 2021-10-31 ASSESSMENT — ACTIVITIES OF DAILY LIVING (ADL)
ADLS_ACUITY_SCORE: 10
ADLS_ACUITY_SCORE: 8

## 2021-10-31 ASSESSMENT — MIFFLIN-ST. JEOR
SCORE: 1466
SCORE: 1450.74

## 2021-10-31 NOTE — PLAN OF CARE
ICU End of Shift Summary.  For vital signs and complete assessments, please see documentation flowsheets.     Pertinent assessments: A&Ox4. Afebrile. VSS. Tele shows SR. 1L NC. Lungs clear. Slight KAUR to commode. Regular diet. BS+. Voiding without any coliplications. Bruised skin. Stand-by assist x1. Port and PIC.     Major Shift Events: -Transferred to floor                                       -Switched BG to AC HS and carb coverage.     Plan (Upcoming Events): Continue to wean oxygen. PT for strengthening? Steroids.   Discharge/Transfer Needs: TBD    8358-Telephone report given to nurse Mery on 6th floor

## 2021-10-31 NOTE — PROGRESS NOTES
"Pt arrived 1440 from ICE. Report received from ICU RN. Pt settled in room. VSS 1L NC   /49   Pulse 68   Temp 97.1  F (36.2  C) (Oral)   Resp 16   Ht 1.626 m (5' 4\")   Wt 77.5 kg (170 lb 12.8 oz)   SpO2 98%   BMI 29.32 kg/m     Pt said no nausea, SOB, or pain at this time. A&Ox4. Right chest port and PIV R FA.  Report will be given to evening shift RN  "

## 2021-10-31 NOTE — PROGRESS NOTES
[facility]    Hospitalist Progress Note  Name: Mikal Nesbitt    MRN: [id number]  Provider:  Armand Huitron DO MPH  Date of Service: 10/31/2021    Summary of Stay: Mikal Nesbitt is a 71 year old male with a history of Follicular non Hodgkin's lymphoma, WALDEMAR, DM2, Obesity, left HASEEB, HTN, HLP admitted on 10/28/2021 with shortness of breath and confusion.  Tested positive for COVID-19 about 7 days ago, appears symptoms started to some degree around 3 weeks ago.  Developed worsening shortness of breath and confusion over the subsequent several days prompting his presentation to the ED.    Blood pressure 134/44, heart rate 98, temperature 101.6  F, oxygen 90-94% initially on 2 L. Initial labs show sodium 126, potassium 4.9, chloride 95, bicarb 22, BUN 28, creatinine 1.29. Albumin 3.1 otherwise LFTs normal. WBC 4.7, hemoglobin 6.1, platelet count 94. Blood glucose is 141. Lactic acid normal at 1.1 and troponin is undetectable. EKG shows sinus tachycardia with LAFB. Noncontrast head CT n all old for sure egative for any acute pathology. Chest x-ray shows severe bilateral opacities along with his right IJ port.     He was placed on BiPAP and admitted to the ICU.  He received 2 units of packed red blood cells.  He received 6 mg IV dexamethasone and remdesivir and tocilizumab.  No significant issues overnight and now on NC.    Problem List:   1. Acute hypoxic respiratory failure secondary to COVID-19 pneumonia: Initially on BiPAP but quickly improved and now on 1L NC.  Taking dexamethasone for 10 days and remdesivir for 5 days.  Received tocilizumab 10/30.  Started enoxaparin 10/30 given CBC is now stable and no bleeding reports.  2. Acute metabolic encephalopathy: Likely due to hypoxia and COVID-19 infection with possible contribution from hyponatremia.  Mental status does appear to be improving.  3. Acute kidney injury: Creatinine is improving.  This is likely prerenal and related to limited oral intake over  the past several days.  Stop normal saline 10/30.  Hold PTA hydrochlorothiazide.  4. Pancytopenia: Baseline hemoglobin is 7-9.  Was 6.1 on admission and required 2 units of packed red blood cells with hemoglobin now greater than 8.  No reports of bleeding.  Recheck CBC daily, no further transfusions for now.  5. Follicular non-Hodgkin's lymphoma: Diagnosed in 1999 and completed CHOP chemotherapy.  Had recurrent positive retroperitoneal biopsy and bone marrow involvement this year requiring initiation of bendamustine/Rituxan.  Dee on acyclovir and Bactrim for prophylaxis, continue these for now.  Continue prophylactic allopurinol.  6. WALDEMAR: CPAP.  7. Diabetes mellitus: Recent hemoglobin A1c 4.1 but now at risk for steroid-induced hyperglycemia.  Blood sugars currently in the 200-300s.  Hold prior to admission Metformin but resume glipizide and increase NPH to 30 units to be given with dexamethasone and increase to high sliding scale insulin.  Increase to novolog 1 unit(s) per 10 grams carbohydrate.  8. Left carotid artery stenosis: No intervention.  9. Hyperlipidemia: Continue atorvastatin, monitor liver function test while on remdesivir.  10. Hypertension: Blood pressure now stable bu initially somewhat low.  Continue amlodipine (with hold parameters) but hold prior to admission hydrochlorothiazide for now.    DVT Prophylaxis: Start Lovenox 40 mg subcutaneous daily.  Code Status: Full Code  Diet: Regular Diet Adult    Finch Catheter: Not present  Disposition: Expected discharge in 1-2 days to home. Goals prior to discharge include manage respiratory failure. Transfer to F.  Incidental Findings: None.  Family updated today: Not today.        Interval History   The patient reports doing well. No chest pain but still coughing and shortness of breath. No nausea, vomiting, diarrhea, constipation. No fevers. No other specific complaints identified.     -Data reviewed today: I personally reviewed all new labs and  imaging results over the last 24 hours.     Physical Exam   Temp: 97.5  F (36.4  C) Temp src: Oral BP: 138/72 Pulse: 64   Resp: 20 SpO2: 99 % O2 Device: Nasal cannula Oxygen Delivery: 1 LPM  Vitals:    10/29/21 0100 10/30/21 0540 10/31/21 0010   Weight: 81.6 kg (179 lb 14.3 oz) 78.6 kg (173 lb 4.5 oz) 79 kg (174 lb 2.6 oz)     Vital Signs with Ranges  Temp:  [97.4  F (36.3  C)-98.8  F (37.1  C)] 97.5  F (36.4  C)  Pulse:  [57-92] 64  Resp:  [13-77] 20  BP: (109-144)/() 138/72  SpO2:  [62 %-100 %] 99 %  I/O last 3 completed shifts:  In: 3924 [P.O.:2230; I.V.:1694]  Out: 3100 [Urine:3100]    GENERAL: No apparent distress. Awake, alert, and fully oriented. On BiPAP.  HEENT: Normocephalic, atraumatic. Extraocular movements intact.  CARDIOVASCULAR: Regular rate and rhythm without murmurs or rubs. No S3.  PULMONARY: Clear bilaterally.  GASTROINTESTINAL: Soft, non-tender, non-distended. Bowel sounds normoactive.   EXTREMITIES: No cyanosis or clubbing. No edema.  NEUROLOGICAL: CN 2-12 grossly intact, no focal neurological deficits.  DERMATOLOGICAL: No rash, ulcer, bruising, nor jaundice.     Medications     - MEDICATION INSTRUCTIONS -         remdesivir  100 mg Intravenous Q24H    And     sodium chloride 0.9%  50 mL Intravenous Q24H     acyclovir  400 mg Oral BID     amLODIPine  10 mg Oral Daily     atorvastatin  20 mg Oral QPM     dexamethasone  6 mg Intravenous Daily     enoxaparin ANTICOAGULANT  40 mg Subcutaneous Q24H     famotidine  20 mg Intravenous Q12H     fluticasone  1 spray Both Nostrils Daily     glimepiride  4 mg Oral QAM AC     insulin aspart   Subcutaneous TID AC     insulin aspart  1-6 Units Subcutaneous Q4H     insulin NPH  30 Units Subcutaneous Daily     sodium chloride (PF)  3 mL Intracatheter Q8H     sulfamethoxazole-trimethoprim  2 tablet Oral Once per day on Mon Wed Fri     Data     Laboratory:  Recent Labs   Lab 10/31/21  0348 10/30/21  0535 10/29/21  0816 10/29/21  0542 10/29/21  0542   WBC  3.9* 3.8*  --   --  2.1*   HGB 8.3* 8.1* 7.0*   < > 7.0*   HCT 26.6* 25.9*  --   --  21.9*   MCV 90 90  --   --  91   PLT 91* 89*  --   --  66*    < > = values in this interval not displayed.     Recent Labs   Lab 10/31/21  0348 10/31/21  0346 10/30/21  2220 10/30/21  0742 10/30/21  0535 10/29/21  0822 10/29/21  0542     --   --   --  135  --  131*   POTASSIUM 4.6  --   --   --  4.9  --  4.7   CHLORIDE 105  --   --   --  109  --  103   CO2 23  --   --   --  21  --  20   ANIONGAP 5  --   --   --  5  --  8   * 175* 309*   < > 190*   < > 263*   BUN 23  --   --   --  26  --  28   CR 0.85  --   --   --  0.88  --  1.07   GFRESTIMATED 89  --   --   --  88  --  70   LILIAN 8.2*  --   --   --  8.1*  --  7.8*    < > = values in this interval not displayed.     No results for input(s): CULT in the last 168 hours.    Imaging:  No results found for this or any previous visit (from the past 24 hour(s)).      Armand Huitron DO MPH  [facility] Hospitalist  [address] [place] [address]  10/31/2021

## 2021-10-31 NOTE — PLAN OF CARE
RN [id number]  ICU End of Shift Summary.  For vital signs and complete assessments, please see documentation flowsheets.     Pertinent assessments: A&Ox4. VSS. Afebrile. Tele; NSR. Denies pain. SOB with KAUR. Encouraging IS/ CDB. On 1 L O2. Sats WDL. LS: dim. +BS. Voiding WDL. No change to skin. One piv and port SL. Up with SBA and gait belt  Major Shift Events: paged hospitalist at 2050 pm due to high BG at 350 after appropriate sliding scale coverage given per protocol  Plan (Upcoming Events): Transfer to floor once bed available. Continue Remedesevir. Steroids.   Discharge/Transfer Needs: Home once appropriate    Bedside Shift Report Completed : y  Bedside Safety Check Completed: y

## 2021-10-31 NOTE — PLAN OF CARE
ICU End of Shift Summary.  For vital signs and complete assessments, please see documentation flowsheets.     Pertinent assessments: pt is alert and oriented, up with standby assist in the room. Pt voiding without difficulty per urinal. VSS, afebrile. Lungs sound clear but diminished. Pt on 1 liter of oxygen, sats 98-99%, pt preferred to keep oxygen on overnight.   Major Shift Events: none, slept between cares  Plan (Upcoming Events): continue current cares  Discharge/Transfer Needs: none    Bedside Shift Report Completed : y  Bedside Safety Check Completed:y

## 2021-11-01 LAB
ALBUMIN SERPL-MCNC: 2.8 G/DL (ref 3.4–5)
ALP SERPL-CCNC: 56 U/L (ref 40–150)
ALT SERPL W P-5'-P-CCNC: 32 U/L (ref 0–70)
ANION GAP SERPL CALCULATED.3IONS-SCNC: 5 MMOL/L (ref 3–14)
AST SERPL W P-5'-P-CCNC: 17 U/L (ref 0–45)
BILIRUB SERPL-MCNC: 0.7 MG/DL (ref 0.2–1.3)
BUN SERPL-MCNC: 18 MG/DL (ref 7–30)
CALCIUM SERPL-MCNC: 8.3 MG/DL (ref 8.5–10.1)
CHLORIDE BLD-SCNC: 105 MMOL/L (ref 94–109)
CO2 SERPL-SCNC: 26 MMOL/L (ref 20–32)
CREAT SERPL-MCNC: 0.84 MG/DL (ref 0.66–1.25)
CRP SERPL-MCNC: 11.7 MG/L (ref 0–8)
D DIMER PPP FEU-MCNC: 0.65 UG/ML FEU (ref 0–0.5)
ERYTHROCYTE [DISTWIDTH] IN BLOOD BY AUTOMATED COUNT: 17.2 % (ref 10–15)
GFR SERPL CREATININE-BSD FRML MDRD: 89 ML/MIN/1.73M2
GLUCOSE BLD-MCNC: 73 MG/DL (ref 70–99)
GLUCOSE BLDC GLUCOMTR-MCNC: 108 MG/DL (ref 70–99)
GLUCOSE BLDC GLUCOMTR-MCNC: 205 MG/DL (ref 70–99)
GLUCOSE BLDC GLUCOMTR-MCNC: 211 MG/DL (ref 70–99)
GLUCOSE BLDC GLUCOMTR-MCNC: 256 MG/DL (ref 70–99)
GLUCOSE BLDC GLUCOMTR-MCNC: 66 MG/DL (ref 70–99)
GLUCOSE BLDC GLUCOMTR-MCNC: 96 MG/DL (ref 70–99)
HCT VFR BLD AUTO: 26.8 % (ref 40–53)
HGB BLD-MCNC: 8.4 G/DL (ref 13.3–17.7)
MCH RBC QN AUTO: 27.6 PG (ref 26.5–33)
MCHC RBC AUTO-ENTMCNC: 31.3 G/DL (ref 31.5–36.5)
MCV RBC AUTO: 88 FL (ref 78–100)
PLATELET # BLD AUTO: 86 10E3/UL (ref 150–450)
POTASSIUM BLD-SCNC: 4.3 MMOL/L (ref 3.4–5.3)
PROT SERPL-MCNC: 5.3 G/DL (ref 6.8–8.8)
RBC # BLD AUTO: 3.04 10E6/UL (ref 4.4–5.9)
SODIUM SERPL-SCNC: 136 MMOL/L (ref 133–144)
WBC # BLD AUTO: 4 10E3/UL (ref 4–11)

## 2021-11-01 PROCEDURE — 250N000013 HC RX MED GY IP 250 OP 250 PS 637: Performed by: HOSPITALIST

## 2021-11-01 PROCEDURE — 99233 SBSQ HOSP IP/OBS HIGH 50: CPT | Performed by: INTERNAL MEDICINE

## 2021-11-01 PROCEDURE — 80053 COMPREHEN METABOLIC PANEL: CPT | Performed by: INTERNAL MEDICINE

## 2021-11-01 PROCEDURE — 250N000013 HC RX MED GY IP 250 OP 250 PS 637: Performed by: INTERNAL MEDICINE

## 2021-11-01 PROCEDURE — 258N000003 HC RX IP 258 OP 636: Performed by: HOSPITALIST

## 2021-11-01 PROCEDURE — 86140 C-REACTIVE PROTEIN: CPT | Performed by: INTERNAL MEDICINE

## 2021-11-01 PROCEDURE — 85379 FIBRIN DEGRADATION QUANT: CPT | Performed by: INTERNAL MEDICINE

## 2021-11-01 PROCEDURE — 250N000009 HC RX 250: Performed by: HOSPITALIST

## 2021-11-01 PROCEDURE — 85027 COMPLETE CBC AUTOMATED: CPT | Performed by: INTERNAL MEDICINE

## 2021-11-01 PROCEDURE — 250N000011 HC RX IP 250 OP 636: Performed by: HOSPITALIST

## 2021-11-01 PROCEDURE — 120N000001 HC R&B MED SURG/OB

## 2021-11-01 RX ADMIN — AMLODIPINE BESYLATE 10 MG: 10 TABLET ORAL at 08:40

## 2021-11-01 RX ADMIN — REMDESIVIR 100 MG: 100 INJECTION, POWDER, LYOPHILIZED, FOR SOLUTION INTRAVENOUS at 18:31

## 2021-11-01 RX ADMIN — ENOXAPARIN SODIUM 40 MG: 40 INJECTION SUBCUTANEOUS at 09:20

## 2021-11-01 RX ADMIN — DEXAMETHASONE SODIUM PHOSPHATE 6 MG: 10 INJECTION INTRAMUSCULAR; INTRAVENOUS at 12:33

## 2021-11-01 RX ADMIN — GLIMEPIRIDE 4 MG: 4 TABLET ORAL at 06:24

## 2021-11-01 RX ADMIN — FLUTICASONE PROPIONATE 1 SPRAY: 50 SPRAY, METERED NASAL at 08:41

## 2021-11-01 RX ADMIN — ATORVASTATIN CALCIUM 20 MG: 20 TABLET, FILM COATED ORAL at 20:13

## 2021-11-01 RX ADMIN — FAMOTIDINE 20 MG: 10 INJECTION, SOLUTION INTRAVENOUS at 08:40

## 2021-11-01 RX ADMIN — SULFAMETHOXAZOLE AND TRIMETHOPRIM 2 TABLET: 400; 80 TABLET ORAL at 09:20

## 2021-11-01 RX ADMIN — ACYCLOVIR 400 MG: 400 TABLET ORAL at 20:14

## 2021-11-01 RX ADMIN — SODIUM CHLORIDE 50 ML: 9 INJECTION, SOLUTION INTRAVENOUS at 18:34

## 2021-11-01 RX ADMIN — FAMOTIDINE 20 MG: 10 INJECTION, SOLUTION INTRAVENOUS at 20:13

## 2021-11-01 RX ADMIN — ACYCLOVIR 400 MG: 400 TABLET ORAL at 08:40

## 2021-11-01 ASSESSMENT — ACTIVITIES OF DAILY LIVING (ADL)
ADLS_ACUITY_SCORE: 8
ADLS_ACUITY_SCORE: 10
ADLS_ACUITY_SCORE: 8
ADLS_ACUITY_SCORE: 10
ADLS_ACUITY_SCORE: 8
ADLS_ACUITY_SCORE: 10
ADLS_ACUITY_SCORE: 8
ADLS_ACUITY_SCORE: 10
ADLS_ACUITY_SCORE: 8
ADLS_ACUITY_SCORE: 10
ADLS_ACUITY_SCORE: 8
ADLS_ACUITY_SCORE: 10
ADLS_ACUITY_SCORE: 10
ADLS_ACUITY_SCORE: 8
ADLS_ACUITY_SCORE: 10
ADLS_ACUITY_SCORE: 10

## 2021-11-01 NOTE — PROGRESS NOTES
[facility]  Hospitalist Progress Note  Name: Mikal Nesbitt    MRN: [id number]  Provider:  Iqra Hernandez MD   Date of Service: 11/01/2021    Summary of Stay: Mikal Nesbitt is a 71 year old male with a history of Follicular non Hodgkin's lymphoma, WALDEMAR, DM2, Obesity, left HASEEB, HTN, HLP admitted on 10/28/2021 with shortness of breath and confusion in the setting of known COVID-19 (diagnosed 10/21) and was found to have acute hypoxic respiratory failure due to COVID-19 pneumonia as well as JESS and hyponatremia.    Patient was placed on BiPAP and admitted to the ICU.  He received 2 units of packed red blood cells for acute on chronic anemia.  He was started dexamethasone and remdesivir and also received tocilizumab.  Improved and transitioned to NC and transferred out of ICU on 10/31.  Remains very weak with poor appetite but improving.    Problem List:     1. Acute hypoxic respiratory failure secondary to COVID-19 pneumonia: Initially on BiPAP but quickly improved to NC and this morning to RA.  Taking dexamethasone for 10 days and remdesivir for 5 days.  Received tocilizumab 10/30.  Started enoxaparin 10/30 given CBC is now stable and no bleeding reports. Biggest concern now is fatigue, encouraged him to start ambulating in his room today.    2. Acute metabolic encephalopathy - Resolved: Likely due to hypoxia and COVID-19 infection with possible contribution from hyponatremia.  Mental status does appear to be improving.    3. Acute kidney injury - Resolved: Creatinine 1.29 on admission, base ~1.  This is likely prerenal and related to limited oral intake over the past several days.  Had received IVF but stopped 10/30.  Hold PTA hydrochlorothiazide. Creatinine now stable.    4. Pancytopenia: Baseline hemoglobin is 7-9.  Was 6.1 on admission and required 2 units of packed red blood cells with hemoglobin now greater than 8.  No reports of bleeding.  Recheck CBC daily, no further transfusions for now.  Platelet count also low, lowest 66 but stable and slightly improving.    5. Follicular non-Hodgkin's lymphoma: Diagnosed in 1999 and completed CHOP chemotherapy.  Had recurrent positive retroperitoneal biopsy and bone marrow involvement this year requiring initiation of bendamustine/Rituxan.  Remains on acyclovir and Bactrim for prophylaxis, continue these.  Continue prophylactic allopurinol.    6. WALDEMAR: CPAP per home settings.    7. Diabetes mellitus: Recent hemoglobin A1c 4.1 but at risk for steroid-induced hyperglycemia.  PTA on Metformin and Glipizide.  He was more hyperglycemic and initiated on NPH which was increased to 30 units.  This morning had hypoglycemia.  Will decrease NPH to 20 units with Decadron today.  Continue with Aspart 1 unit per 10 carbohydrates and high intensity sliding scale insulin.  Continue Glipizide but hold Metformin.    8. Left carotid artery stenosis: No intervention.    9. Hyperlipidemia: Continue atorvastatin, monitor liver function test while on remdesivir.    10. Hypertension: Blood pressure now stable but initially somewhat low.  Continue amlodipine (with hold parameters) but hold prior to admission hydrochlorothiazide for now particularly as he is not eating much.    11. Weakness: Likely due to COVID and prolonged illness.  Feels that he can start ambulating today. Also encouraged PO intake. Does not feel that he needs PT.    DVT Prophylaxis: Lovenox 40 mg subcutaneous daily.  Code Status: Full Code  Diet: Regular Diet Adult    Finch Catheter: Not present  Disposition: Expected discharge in 1-2 days to home. Goals prior to discharge include manage respiratory failure.        Interval History   The patient was seen with his bedside nurse this morning.  He states that overall he does feel better than admission.  Still having an intermittent dry cough.  Denies chest pain.  Feels short of breath when he moves around.  He still has a poor appetite but he thinks he might be able to eat  a little bit more today.  No nausea, vomiting or abdominal pain.  He would like to try to walk around today.  He feels extremely tired and worn out.  He does not feel that he needs physical therapy.    -Data reviewed today: I personally reviewed all new labs and imaging results over the last 24 hours.     Physical Exam   Temp: 97.8  F (36.6  C) Temp src: Temporal BP: 120/62 Pulse: 70   Resp: 18 SpO2: 94 % O2 Device: None (Room air) Oxygen Delivery: 1 LPM  Vitals:    10/30/21 0540 10/31/21 0010 10/31/21 1441   Weight: 78.6 kg (173 lb 4.5 oz) 79 kg (174 lb 2.6 oz) 77.5 kg (170 lb 12.8 oz)     Vital Signs with Ranges  Temp:  [96.9  F (36.1  C)-98.2  F (36.8  C)] 97.8  F (36.6  C)  Pulse:  [68-83] 70  Resp:  [16-26] 18  BP: (120-133)/(49-71) 120/62  SpO2:  [94 %-98 %] 94 %  I/O last 3 completed shifts:  In: 350 [P.O.:350]  Out: 4200 [Urine:4200]    GENERAL: No apparent distress. Awake, alert, and fully oriented.   HEENT: Normocephalic, atraumatic. Extraocular movements intact. MMM, no scleral icterus or injection  CARDIOVASCULAR: Regular rate and rhythm without murmurs or rubs. No S3.  PULMONARY: Clear bilaterally. No crackles or wheezing, speaking in full sentences, no respiratory distress  GASTROINTESTINAL: Soft, non-tender, non-distended. Bowel sounds normoactive.   EXTREMITIES: No cyanosis or clubbing. No edema.  NEUROLOGICAL: Speech clear, no focal neurological deficits.  DERMATOLOGICAL: No rash, ulcer, bruising, nor jaundice.     Medications     - MEDICATION INSTRUCTIONS -         remdesivir  100 mg Intravenous Q24H    And     sodium chloride 0.9%  50 mL Intravenous Q24H     acyclovir  400 mg Oral BID     amLODIPine  10 mg Oral Daily     atorvastatin  20 mg Oral QPM     dexamethasone  6 mg Intravenous Daily     enoxaparin ANTICOAGULANT  40 mg Subcutaneous Q24H     famotidine  20 mg Intravenous Q12H     fluticasone  1 spray Both Nostrils Daily     glimepiride  4 mg Oral QAM AC     insulin aspart  1-10 Units  Subcutaneous TID AC     insulin aspart  1-7 Units Subcutaneous At Bedtime     insulin aspart   Subcutaneous TID AC     insulin NPH  20 Units Subcutaneous Daily     sodium chloride (PF)  3 mL Intracatheter Q8H     sulfamethoxazole-trimethoprim  2 tablet Oral Once per day on Mon Wed Fri     Data     Laboratory:  Recent Labs   Lab 11/01/21  0629 10/31/21  0348 10/30/21  0535   WBC 4.0 3.9* 3.8*   HGB 8.4* 8.3* 8.1*   HCT 26.8* 26.6* 25.9*   MCV 88 90 90   PLT 86* 91* 89*     Recent Labs   Lab 11/01/21  0853 11/01/21  0831 11/01/21 0629 10/31/21  0843 10/31/21  0348 10/30/21  0742 10/30/21  0535   NA  --   --  136  --  133  --  135   POTASSIUM  --   --  4.3  --  4.6  --  4.9   CHLORIDE  --   --  105  --  105  --  109   CO2  --   --  26  --  23  --  21   ANIONGAP  --   --  5  --  5  --  5   GLC 96 66* 73   < > 200*   < > 190*   BUN  --   --  18  --  23  --  26   CR  --   --  0.84  --  0.85  --  0.88   GFRESTIMATED  --   --  89  --  89  --  88   LILIAN  --   --  8.3*  --  8.2*  --  8.1*    < > = values in this interval not displayed.     No results for input(s): CULT in the last 168 hours.    Imaging:  No results found for this or any previous visit (from the past 24 hour(s)).      Iqra Hernandez MD   11/01/2021

## 2021-11-01 NOTE — PLAN OF CARE
Pt AXOx4. Anxious, frustrated with being in the hospital. Room air. Denies pain. Ind/SBA in room. Uses urinal, commode. Cont pulse ox. Good oral intake. Accu 66, 96, 205. Educated on diabetes management. L piv sl. R chest port sl. CHG bath and linen change done. Instructed on IS. Remdesivir, decardon, lovenox for COVID19. Will continue to monitor.

## 2021-11-01 NOTE — PLAN OF CARE
RN [id number]  A&Ox4, anxious, VSS on RA. Up ind to bsc. LS clear, KAUR, nonproductive loose cough. Will continue to monitor.

## 2021-11-02 VITALS
TEMPERATURE: 97.7 F | OXYGEN SATURATION: 98 % | BODY MASS INDEX: 29.16 KG/M2 | RESPIRATION RATE: 20 BRPM | DIASTOLIC BLOOD PRESSURE: 56 MMHG | HEIGHT: 64 IN | SYSTOLIC BLOOD PRESSURE: 120 MMHG | WEIGHT: 170.8 LBS | HEART RATE: 70 BPM

## 2021-11-02 LAB
GLUCOSE BLDC GLUCOMTR-MCNC: 146 MG/DL (ref 70–99)
GLUCOSE BLDC GLUCOMTR-MCNC: 222 MG/DL (ref 70–99)
GLUCOSE BLDC GLUCOMTR-MCNC: 315 MG/DL (ref 70–99)
GLUCOSE BLDC GLUCOMTR-MCNC: 93 MG/DL (ref 70–99)

## 2021-11-02 PROCEDURE — 250N000013 HC RX MED GY IP 250 OP 250 PS 637: Performed by: HOSPITALIST

## 2021-11-02 PROCEDURE — 250N000009 HC RX 250: Performed by: HOSPITALIST

## 2021-11-02 PROCEDURE — 250N000013 HC RX MED GY IP 250 OP 250 PS 637: Performed by: INTERNAL MEDICINE

## 2021-11-02 PROCEDURE — 250N000012 HC RX MED GY IP 250 OP 636 PS 637: Performed by: INTERNAL MEDICINE

## 2021-11-02 PROCEDURE — 99239 HOSP IP/OBS DSCHRG MGMT >30: CPT | Performed by: INTERNAL MEDICINE

## 2021-11-02 RX ORDER — DEXAMETHASONE 6 MG/1
6 TABLET ORAL DAILY
Qty: 4 TABLET | Refills: 0 | Status: SHIPPED | OUTPATIENT
Start: 2021-11-03

## 2021-11-02 RX ADMIN — GLIMEPIRIDE 4 MG: 4 TABLET ORAL at 08:59

## 2021-11-02 RX ADMIN — FAMOTIDINE 20 MG: 10 INJECTION, SOLUTION INTRAVENOUS at 08:58

## 2021-11-02 RX ADMIN — FLUTICASONE PROPIONATE 1 SPRAY: 50 SPRAY, METERED NASAL at 09:15

## 2021-11-02 RX ADMIN — DEXAMETHASONE 6 MG: 2 TABLET ORAL at 08:59

## 2021-11-02 RX ADMIN — AMLODIPINE BESYLATE 10 MG: 10 TABLET ORAL at 08:59

## 2021-11-02 RX ADMIN — ACYCLOVIR 400 MG: 400 TABLET ORAL at 08:58

## 2021-11-02 ASSESSMENT — ACTIVITIES OF DAILY LIVING (ADL)
ADLS_ACUITY_SCORE: 8
ADLS_ACUITY_SCORE: 8
ADLS_ACUITY_SCORE: 10
ADLS_ACUITY_SCORE: 8
ADLS_ACUITY_SCORE: 10
ADLS_ACUITY_SCORE: 8

## 2021-11-02 NOTE — DISCHARGE SUMMARY
[facility]  Discharge Summary  Name: Mikal Nesbitt    MRN: [id number]  YOB: 1950    Age: 71 year old  Date of Discharge:  11/2/2021  Date of Admission: 10/28/2021  Primary Care Provider: Clinic, [facility]  Discharge Physician:  Iqra Hernandez MD  Discharging Service:  Hospitalist      Discharge Diagnoses:  1.  Acute hypoxic respiratory failure secondary to COVID-19 viral pneumonia  2.  Acute metabolic encephalopathy   3.  Acute kidney injury  4.  Pancytopenia  5.  Follicular non-Hodgkin's lymphoma  6.  WALDEMAR  7.  DM 2  8.  Left carotid artery stenosis  9.  Hyperlipidemia   10.  Hypertension  11.  Weakness     Follow-ups Needed After Discharge   Follow up with PCP within one week    Unresulted Labs Ordered in the Past 30 Days of this Admission     No orders found from 10/16/2018 to 12/16/2018.        Hospital Course:  Mikal Nesbitt is a 71 year old male with a history of Follicular non Hodgkin's lymphoma, WALDEMAR, DM2, Obesity, left HASEEB, HTN, HLP admitted on 10/28/2021 with shortness of breath and confusion in the setting of known COVID-19 (diagnosed 10/21) and was found to have acute hypoxic respiratory failure due to COVID-19 pneumonia as well as JESS and hyponatremia.     Patient was placed on BiPAP and admitted to the ICU.  He received 2 units of packed red blood cells for acute on chronic anemia.  He was started dexamethasone and remdesivir and also received tocilizumab.  Improved and transitioned to NC and transferred out of ICU on 10/31.  By day of discharge he was on RA for >24 hours and oral intake had improved.     Acute hypoxic respiratory failure secondary to COVID-19 pneumonia: Initially on BiPAP but quickly improved to NC and subsequently to RA for >24 hours prior to discharge.  Will complete dexamethasone for 10 total days, received 5 days Remdesivir prior to discharge.  Received tocilizumab 10/30.  Started enoxaparin 10/30 given CBC is now stable and no bleeding reports.  He was quite fatigued and not eating well but by day of discharge oral intake had improved and able to ambulate independently. He will also discharge with 30 days Xarelto for prophylaxis.     Acute metabolic encephalopathy - Resolved: Likely due to hypoxia and COVID-19 infection with possible contribution from hyponatremia.  Mental status does appear to be improving.     Acute kidney injury - Resolved: Creatinine 1.29 on admission, base ~1.  This is likely prerenal and related to limited oral intake over the past several days.  Had received IVF but stopped 10/30.  Hold PTA hydrochlorothiazide. Creatinine now stable.     Pancytopenia: Baseline hemoglobin is 7-9.  Was 6.1 on admission and required 2 units of packed red blood cells with hemoglobin now greater than 8.  No reports of bleeding.  Recheck CBC daily, no further transfusions for now. Platelet count also low, lowest 66 but stable and slightly improving.     Follicular non-Hodgkin's lymphoma: Diagnosed in 1999 and completed CHOP chemotherapy.  Had recurrent positive retroperitoneal biopsy and bone marrow involvement this year requiring initiation of bendamustine/Rituxan.  Remains on acyclovir and Bactrim for prophylaxis, continue these.  Continue prophylactic allopurinol.     WALDEMAR: CPAP per home settings.     Diabetes mellitus: Recent hemoglobin A1c 4.1 but at risk for steroid-induced hyperglycemia.  PTA on Metformin and Glipizide.  Had been on NPH and sliding scale insulin while hospitalized.  Will return to Glipizide and Metformin at time of discharge.     Left carotid artery stenosis: No intervention.     Hyperlipidemia: Continue atorvastatin, monitor liver function test while on remdesivir.     Hypertension: Blood pressure now stable but initially somewhat low. At discharge resume both Amlodipine and HCTZ.     Weakness: Likely due to COVID and prolonged illness.  Feels that he can start ambulating today. Also encouraged PO intake. Does not feel that he needs  "PT. Able to ambulate independently around room.       Discharge Disposition:  Discharged to home     Allergies:  Allergies   Allergen Reactions     Amoxicillin Hives     Lisinopril      Might be the reason that causes his coughing      Penicillins      Unknown        Condition on Discharge:  Discharge condition: Stable   Discharge vitals: Blood pressure 120/56, pulse 70, temperature 97.7  F (36.5  C), temperature source Temporal, resp. rate 20, height 1.626 m (5' 4\"), weight 77.5 kg (170 lb 12.8 oz), SpO2 99 %.   Code status on discharge: Full Code     History of Illness:  See detailed admission note for full details.    Physical Exam:  Blood pressure 120/56, pulse 70, temperature 97.7  F (36.5  C), temperature source Temporal, resp. rate 20, height 1.626 m (5' 4\"), weight 77.5 kg (170 lb 12.8 oz), SpO2 99 %.  Wt Readings from Last 1 Encounters:   10/31/21 77.5 kg (170 lb 12.8 oz)     General: Alert, awake, no acute distress. Sitting at the edge of the bed eating breakfast.  HEENT: Normocephalic, atraumatic, eyes anicteric and without scleral injection, EOMI, MMM.  Cardiac: RRR, normal S1, S2.  No m/g/r. No LE edema.  Pulmonary: Normal chest rise, normal work of breathing.  Lungs CTAB without crackles or wheezing  Abdomen: soft, non-tender, non-distended.  Normoactive BS.  No guarding or rebound tenderness.  Extremities: no deformities.  Warm, well perfused.  Skin: no rashes or lesions noted.  Warm and Dry.  Neuro: No focal deficits noted.  Speech clear.  Coordination and strength grossly normal.  Psych: Appropriate affect. Alert and oriented x3    Procedures other than Imaging:  BiPAP     Imaging:  Results for orders placed or performed during the hospital encounter of 10/28/21   CT Head w/o Contrast    Narrative    EXAM: CT HEAD W/O CONTRAST  LOCATION: [facility]  DATE/TIME: 10/28/2021 6:40 PM    INDICATION: Mental status change, unknown cause.  COMPARISON: None.  TECHNIQUE: Routine CT Head " without IV contrast. Multiplanar reformats. Dose reduction techniques were used.    FINDINGS:  INTRACRANIAL CONTENTS: No intracranial hemorrhage, extraaxial collection, or mass effect.  No CT evidence of acute infarct. Mild presumed chronic small vessel ischemic changes. Normal ventricles and sulci. Position of cerebellar tonsils is satisfactory.   Sella shows no acute abnormality. Mild motion artifact degrades image quality. Corpus callosum is satisfactory.    VISUALIZED ORBITS/SINUSES/MASTOIDS: No acute orbital process. Mild to moderate mucosal thickening scattered about the paranasal sinuses. Air-fluid level right maxillary sinus suggests acute inflammatory sinusitis in this region. No middle ear or mastoid   effusion.    BONES/SOFT TISSUES: No fracture of the calvarium or skull base. Satisfactory mineralization. No significant swelling of the facial or scalp tissues.      Impression    IMPRESSION:  1.  No CT evidence for acute intracranial process.  2.  Brain atrophy and presumed chronic microvascular ischemic changes as above. No intracranial mass, mass effect, or acute/evolving infarction.    3.  Air-fluid level right maxillary sinus may indicate acute right maxillary sinus inflammatory change.    4.  Mild motion artifact does degrade image quality.   XR Chest 1 View    Narrative    EXAM: XR CHEST 1 VIEW  LOCATION: [facility]  DATE/TIME: 10/28/2021 6:50 PM    INDICATION: covid  COMPARISON: None.      Impression    IMPRESSION: Severe bilateral airspace opacities consistent with pneumonia. Right IJ port catheter tip in the right atrium. No fluid collections or pneumothorax. Normal heart size.        Consultations:  Consultations This Hospital Stay   PHYSICAL THERAPY ADULT IP CONSULT  OCCUPATIONAL THERAPY ADULT IP CONSULT     Recent Lab Results:  Recent Labs   Lab 11/01/21  0629 10/31/21  0348 10/30/21  0535   WBC 4.0 3.9* 3.8*   HGB 8.4* 8.3* 8.1*   HCT 26.8* 26.6* 25.9*   MCV 88 90 90    PLT 86* 91* 89*     Recent Labs   Lab 11/02/21  1103 11/02/21  0811 11/02/21  0143 11/01/21  0831 11/01/21  0629 10/31/21  0843 10/31/21  0348 10/30/21  0742 10/30/21  0535 10/29/21  0822 10/29/21  0542   NA  --   --   --   --  136  --  133  --  135   < > 131*   POTASSIUM  --   --   --   --  4.3  --  4.6  --  4.9   < > 4.7   CHLORIDE  --   --   --   --  105  --  105  --  109   < > 103   CO2  --   --   --   --  26  --  23  --  21   < > 20   ANIONGAP  --   --   --   --  5  --  5  --  5   < > 8   * 93 146*   < > 73   < > 200*   < > 190*   < > 263*   BUN  --   --   --   --  18  --  23  --  26   < > 28   CR  --   --   --   --  0.84  --  0.85  --  0.88   < > 1.07   GFRESTIMATED  --   --   --   --  89  --  89  --  88   < > 70   LILIAN  --   --   --   --  8.3*  --  8.2*  --  8.1*   < > 7.8*   MAG  --   --   --   --   --   --   --   --   --   --  2.0   PROTTOTAL  --   --   --   --  5.3*  --  5.6*  --  5.5*   < > 5.7*   ALBUMIN  --   --   --   --  2.8*  --  2.8*  --  2.6*   < > 2.5*   BILITOTAL  --   --   --   --  0.7  --  0.5  --  0.4   < > 0.4   ALKPHOS  --   --   --   --  56  --  62  --  62   < > 63   AST  --   --   --   --  17  --  27  --  18   < > 22   ALT  --   --   --   --  32  --  33  --  22   < > 23    < > = values in this interval not displayed.     Recent Labs   Lab 11/01/21  0629   DD 0.65*     Recent Labs   Lab 11/01/21  0629 10/31/21  0348 10/30/21  0535   CRP 11.7* 23.5* 56.5*     Recent Labs   Lab 10/29/21  0132 10/28/21  1740   TROPONIN <0.015 <0.015          Pending Results:    Unresulted Labs Ordered in the Past 30 Days of this Admission     No orders found from 9/28/2021 to 10/29/2021.           Discharge Instructions and Follow-Up:   Discharge Orders      COVID-19 [facility] Referral      Reason for your hospital stay    You were hospitalized for COVID-19 viral pneumonia.  Your oxygen levels have returned to normal.  You are still weak and fatigued and this will take some time to return to normal.      Contact provider    Contact your primary care provider if If increased trouble breathing, new arm/leg swelling, dizziness/passing out, falls, bleeding that doesn't stop, or uncontrolled pain.     Follow-up and recommended labs and tests     Follow up with primary care provider, [facility], within 7 days for hospital follow- up.  No follow up labs or test are needed.     Discharge - Quarantine/Isolation Instruction    Date of symptom onset:     Date of first positive test:    If you tested positive COVID-19 and show symptoms (fever, cough, body aches or trouble breathing):        Stay home and away from others (self-isolate) until:        At least 10 days have passed since your symptoms started. AND...        You've had no fever-and no medicine that reduces fever for 1 full day (24 hours). AND...         Your other symptoms have resolved (gotten better).  If you tested positive for COVID-19 but don't show symptoms:       Stay home and away from others (self-isolate) until at least 10 days have passed since the date of your first positive COVID-19 test.     Activity    Your activity upon discharge: activity as tolerated     Discharge Instructions    You will be prescribed Xarelto for 30 days.  This is to prevent blood clots from forming in the setting of COVID-19 infection which is a very inflammatory illness.  While you are taking Xarelto do not take NSAIDs (ibuprofen, naproxen) as this can lead to increased risk of breathing.  Tylenol is safe to take with Xarelto.     Diet    Follow this diet upon discharge: Orders Placed This Encounter      Regular Diet Adult     Discharge Medications   Current Discharge Medication List      START taking these medications    Details   dexamethasone (DECADRON) 6 MG tablet Take 1 tablet (6 mg) by mouth daily  Qty: 4 tablet, Refills: 0    Associated Diagnoses: Infection due to 2019 novel coronavirus      rivaroxaban ANTICOAGULANT (XARELTO) 10 MG TABS tablet  Take 1 tablet (10 mg) by mouth daily (with dinner)  Qty: 30 tablet, Refills: 0    Associated Diagnoses: Infection due to 2019 novel coronavirus         CONTINUE these medications which have NOT CHANGED    Details   acyclovir (ZOVIRAX) 400 MG tablet Take 400 mg by mouth 2 times daily      amLODIPine (NORVASC) 10 MG tablet Take 10 mg by mouth daily      atorvastatin (LIPITOR) 20 MG tablet Take 20 mg by mouth daily      azelastine (ASTELIN) 0.1 % nasal spray Spray 1 spray into both nostrils 2 times daily as needed       coenzyme Q-10 200 MG CAPS Take 200 mg by mouth daily      fish oil-omega-3 fatty acids 1000 MG capsule Take 1 g by mouth daily       fluticasone (FLONASE) 50 MCG/ACT nasal spray Spray 2 sprays into both nostrils 2 times daily       glimepiride (AMARYL) 4 MG tablet Take 4 mg by mouth every morning (before breakfast)      hydrochlorothiazide (HYDRODIURIL) 25 MG tablet Take 12.5 mg by mouth daily Take 1/2 of 25mg tab (12.5mg) daily.      ipratropium (ATROVENT) 0.03 % nasal spray Spray 2 sprays into both nostrils every 8 hours as needed for rhinitis      metFORMIN (GLUCOPHAGE-XR) 500 MG 24 hr tablet Take 1,000 mg by mouth 2 times daily (with meals)      Multiple Vitamins-Minerals (CENTRUM SILVER PO) Take 1 tablet by mouth daily      psyllium (METAMUCIL/KONSYL) 58.6 % powder Take 1 Tablespoonful by mouth daily as needed for constipation      sulfamethoxazole-trimethoprim (BACTRIM DS) 800-160 MG tablet Take 1 tablet by mouth three times a week 1tab by mouth three times weekly on Mon, Wed, Fri*             Time Spent on this Encounter   I, Iqra Hernandez MD, personally saw the patient today and spent greater than 30 minutes discharging this patient.    Iqra Hernandez MD

## 2021-11-02 NOTE — PHARMACY - DISCHARGE MEDICATION RECONCILIATION AND EDUCATION
Discharge Education    Patient was counseled via phone ([employer]) on: Xarelto and Dexamethasone.    MD was contacted with any questions/concerns:None    Additional medication history information:None    Discharge Medication List     Review of your medicines      START taking      Dose / Directions   dexamethasone 6 MG tablet  Commonly known as: DECADRON  Used for: Infection due to 2019 novel coronavirus      Dose: 6 mg  Start taking on: November 3, 2021  Take 1 tablet (6 mg) by mouth daily  Quantity: 4 tablet  Refills: 0     rivaroxaban ANTICOAGULANT 10 MG Tabs tablet  Commonly known as: XARELTO  Used for: Infection due to 2019 novel coronavirus      Dose: 10 mg  Take 1 tablet (10 mg) by mouth daily (with dinner)  Quantity: 30 tablet  Refills: 0        CONTINUE these medicines which have NOT CHANGED      Dose / Directions   acyclovir 400 MG tablet  Commonly known as: ZOVIRAX      Dose: 400 mg  Take 400 mg by mouth 2 times daily  Refills: 0     amLODIPine 10 MG tablet  Commonly known as: NORVASC      Dose: 10 mg  Take 10 mg by mouth daily  Refills: 0     atorvastatin 20 MG tablet  Commonly known as: LIPITOR      Dose: 20 mg  Take 20 mg by mouth daily  Refills: 0     azelastine 0.1 % nasal spray  Commonly known as: ASTELIN      Dose: 1 spray  Spray 1 spray into both nostrils 2 times daily as needed  Refills: 0     CENTRUM SILVER PO      Dose: 1 tablet  Take 1 tablet by mouth daily  Refills: 0     coenzyme Q-10 200 MG Caps      Dose: 200 mg  Take 200 mg by mouth daily  Refills: 0     fish oil-omega-3 fatty acids 1000 MG capsule      Dose: 1 g  Take 1 g by mouth daily  Refills: 0     fluticasone 50 MCG/ACT nasal spray  Commonly known as: FLONASE      Dose: 2 spray  Spray 2 sprays into both nostrils 2 times daily  Refills: 0     glimepiride 4 MG tablet  Commonly known as: AMARYL      Dose: 4 mg  Take 4 mg by mouth every morning (before breakfast)  Refills: 0     hydrochlorothiazide 25 MG tablet  Commonly known as:  HYDRODIURIL      Dose: 12.5 mg  Take 12.5 mg by mouth daily Take 1/2 of 25mg tab (12.5mg) daily.  Refills: 0     ipratropium 0.03 % nasal spray  Commonly known as: ATROVENT      Dose: 2 spray  Spray 2 sprays into both nostrils every 8 hours as needed for rhinitis  Refills: 0     metFORMIN 500 MG 24 hr tablet  Commonly known as: GLUCOPHAGE-XR      Dose: 1,000 mg  Take 1,000 mg by mouth 2 times daily (with meals)  Refills: 0     psyllium 58.6 % powder  Commonly known as: METAMUCIL/KONSYL      Dose: 1 Tablespoonful  Take 1 Tablespoonful by mouth daily as needed for constipation  Refills: 0     sulfamethoxazole-trimethoprim 800-160 MG tablet  Commonly known as: BACTRIM DS      Dose: 1 tablet  Take 1 tablet by mouth three times a week 1tab by mouth three times weekly on Mon, Wed, Fri*  Refills: 0           Where to get your medicines      These medications were sent to [facility] [place], [place] - [address] [address]  [address] [address] [address]    Phone: [phone]     dexamethasone 6 MG tablet    rivaroxaban ANTICOAGULANT 10 MG Tabs tablet

## 2021-11-02 NOTE — PLAN OF CARE
Pharmacist gave med instructions for pt. Pt verbalized understanding to all discharge instructions and had no further questions. PIV out. Denies CP, SOB. Pt escorted out by staff in wheelchair. Pt left with all belongings. Covid discharge packet explained and given to pt.

## 2021-11-02 NOTE — PLAN OF CARE
AOx4. VSS. Denies pain. Denies SOB. Room air. Blood sugar 146. Independent in room. Cont pulse ox on, sats mid 90's. R port and PIV saline locked. Remdesivir, decadron, and lovenox for COVID. Continue to monitor.

## 2021-11-02 NOTE — PLAN OF CARE
Pt a/o x4. VSS. Denies pain. Infrequent cough. Blood sugars 211 and 256. +1 edema in lower extremities. LS clear. On room air. Treating with Lovenox, decadron, and remdesivir. Continue monitoring.

## 2021-11-03 ENCOUNTER — HOSPITAL ENCOUNTER (EMERGENCY)
Facility: CLINIC | Age: 71
Discharge: HOME | End: 2021-11-03
Admitting: EMERGENCY MEDICINE
Payer: COMMERCIAL

## 2021-11-03 ENCOUNTER — NURSE TRIAGE (OUTPATIENT)
Dept: NURSING | Facility: CLINIC | Age: 71
End: 2021-11-03

## 2021-11-03 PROCEDURE — 250N000011 HC RX IP 250 OP 636: Performed by: EMERGENCY MEDICINE

## 2021-11-03 PROCEDURE — 999N000104 HC STATISTIC NO CHARGE

## 2021-11-03 RX ORDER — HEPARIN SODIUM,PORCINE 10 UNIT/ML
5 VIAL (ML) INTRAVENOUS ONCE
Status: COMPLETED | OUTPATIENT
Start: 2021-11-03 | End: 2021-11-03

## 2021-11-03 RX ADMIN — SODIUM CHLORIDE, PRESERVATIVE FREE 5 ML: 5 INJECTION INTRAVENOUS at 13:24

## 2021-11-03 NOTE — ED NOTES
Denney needle removed after it was flushed with saline and heparin. Pt had no complaint and tolerated removal well. Pt did not see an MD, this was a nurse only visit.

## 2021-11-03 NOTE — TELEPHONE ENCOUNTER
Wife Annel is calling and states that Mikal was discharged yesterday afternoon and when Mikal got home they noticed a tube in his port in his chest.  ED advised that Mikal come back to ED to have removed.  Today wife Annel states that they are going to go back to ED to have tubing removed today.    COVID 19 Nurse Triage Plan/Patient Instructions    Please be aware that novel coronavirus (COVID-19) may be circulating in the community. If you develop symptoms such as fever, cough, or SOB or if you have concerns about the presence of another infection including coronavirus (COVID-19), please contact your health care provider or visit [email or web address].     Disposition/Instructions    Home care recommended. Follow home care protocol based instructions.    Thank you for taking steps to prevent the spread of this virus.  o Limit your contact with others.  o Wear a simple mask to cover your cough.  o Wash your hands well and often.    Resources    [facility]: About COVID-19: [email or web address] [email or web address][email or web address]    CDC: What to Do If You're Sick: www.cdc.gov/coronavirus/2019-ncov/about/steps-when-sick.html    CDC: Ending Home Isolation: www.cdc.gov/coronavirus/2019-ncov/hcp/disposition-in-home-patients.html     CDC: Caring for Someone: www.cdc.gov/coronavirus/2019-ncov/if-you-are-sick/care-for-someone.html     [facility]: Interim Guidance for Hospital Discharge to Home: www.health.[facility].[place]/diseases/coronavirus/hcp/hospdischarge.pdf    [facility] clinical trials (COVID-19 research studies): clinicalaffairs.[email or web address]/umn-clinical-trials     Below are the COVID-19 hotlines at the [facility] ([facility]). Interpreters are available.   o For health questions: Call 649-400-4815 or 1-192.408.6946 (7 a.m. to 7 p.m.)  o For questions about schools and childcare: Call 742-365-1841 or 1-523.916.2303 (7 a.m. to 7 p.m.)

## 2021-11-03 NOTE — ED TRIAGE NOTES
Pt was hospitalized 10/28-11/2 for covid, hypoxia and anemia. Pt has a power port right anterior chest wall that was accessed while he was in the hospital, maher needle was not removed prior to discharge home. Wife called nurse line and they advised pt come to ED to have the port de-accessed.

## 2024-10-17 NOTE — PLAN OF CARE
"/62 (BP Location: Left arm)   Pulse 83   Temp 96.9  F (36.1  C) (Temporal)   Resp 16   Ht 1.626 m (5' 4\")   Wt 77.5 kg (170 lb 12.8 oz)   SpO2 95%   BMI 29.32 kg/m    Orientation: A&Ox4  Resp: LS Diminished, 1L O2 NC, KAUR, infrequent nonproductive cough.  Activity: SBA  Diet: MOD-CHO  Voiding: spontaneously using urinal  Discharge Plan: TBD, cont Lovenox, remdesivir and decadron.     " Orders placed.  Please fax to [facility].